# Patient Record
Sex: MALE | Race: WHITE
[De-identification: names, ages, dates, MRNs, and addresses within clinical notes are randomized per-mention and may not be internally consistent; named-entity substitution may affect disease eponyms.]

---

## 2017-01-28 ENCOUNTER — HOSPITAL ENCOUNTER (INPATIENT)
Dept: HOSPITAL 53 - M ED | Age: 42
LOS: 10 days | Discharge: NURSING, REHAB OR LONG-TERM CARE | DRG: 755 | End: 2017-02-07
Attending: PSYCHIATRY & NEUROLOGY | Admitting: PSYCHIATRY & NEUROLOGY
Payer: SELF-PAY

## 2017-01-28 VITALS — DIASTOLIC BLOOD PRESSURE: 80 MMHG | SYSTOLIC BLOOD PRESSURE: 131 MMHG

## 2017-01-28 VITALS — WEIGHT: 187.83 LBS | HEIGHT: 74 IN | BODY MASS INDEX: 24.11 KG/M2

## 2017-01-28 DIAGNOSIS — F15.90: ICD-10-CM

## 2017-01-28 DIAGNOSIS — F43.10: Primary | ICD-10-CM

## 2017-01-28 DIAGNOSIS — F12.90: ICD-10-CM

## 2017-01-28 DIAGNOSIS — Z88.0: ICD-10-CM

## 2017-01-28 DIAGNOSIS — F17.210: ICD-10-CM

## 2017-01-28 LAB
ALBUMIN SERPL BCG-MCNC: 4.2 GM/DL (ref 3.2–5.2)
ALBUMIN/GLOB SERPL: 1.31 {RATIO} (ref 1–1.93)
ALP SERPL-CCNC: 61 U/L (ref 45–117)
ALT SERPL W P-5'-P-CCNC: 21 U/L (ref 12–78)
AMPHETAMINES UR QL SCN: NEGATIVE
ANION GAP SERPL CALC-SCNC: 9 MEQ/L (ref 8–16)
AST SERPL-CCNC: 15 U/L (ref 15–37)
BENZODIAZ UR QL SCN: NEGATIVE
BILIRUB CONJ SERPL-MCNC: 0.1 MG/DL (ref 0–0.2)
BILIRUB SERPL-MCNC: 0.3 MG/DL (ref 0.2–1)
BUN SERPL-MCNC: 20 MG/DL (ref 7–18)
BZE UR QL SCN: NEGATIVE
CALCIUM SERPL-MCNC: 9.2 MG/DL (ref 8.5–10.1)
CHLORIDE SERPL-SCNC: 103 MEQ/L (ref 98–107)
CO2 SERPL-SCNC: 30 MEQ/L (ref 21–32)
CONTROL LINE: (no result)
CREAT SERPL-MCNC: 1.19 MG/DL (ref 0.7–1.3)
ERYTHROCYTE [DISTWIDTH] IN BLOOD BY AUTOMATED COUNT: 14.5 % (ref 11.5–14.5)
GFR SERPL CREATININE-BSD FRML MDRD: > 60 ML/MIN/{1.73_M2} (ref 60–?)
GLUCOSE SERPL-MCNC: 125 MG/DL (ref 70–105)
MCH RBC QN AUTO: 28.6 PG (ref 27–33)
MCHC RBC AUTO-ENTMCNC: 33.3 G/DL (ref 32–36.5)
MCV RBC AUTO: 85.8 FL (ref 80–96)
METHADONE UR QL SCN: NEGATIVE
OPIATES UR QL SCN: NEGATIVE
PLATELET # BLD AUTO: 324 K/MM3 (ref 150–450)
POTASSIUM SERPL-SCNC: 4.1 MEQ/L (ref 3.5–5.1)
PROT SERPL-MCNC: 7.4 GM/DL (ref 6.4–8.2)
SODIUM SERPL-SCNC: 142 MEQ/L (ref 136–145)
TRICYCLIC ANTIDEPRESS URINE: NEGATIVE
WBC # BLD AUTO: 9.8 K/MM3 (ref 4–10)

## 2017-01-28 NOTE — EDDOCDS
Physician Documentation                                                                           

[facility]                                                                         

Name: Cathi Andrew                                                                                 

Age: 41 yrs                                                                                       

Sex: Male                                                                                         

: 1975                                                                                   

MRN: [id number]                                                                                     

Arrival Date: 2017                                                                          

Time: 17:18                                                                                       

Account#: [id number]                                                                              

Bed [facility]                                                                                          

Private MD: NO PRIMARY PHYSICIAN, .                                                               

Disposition:                                                                                      

17 20:26 Hospitalization ordered by Smith Acevedo for Inpatient Admission. Preliminary    

diagnosis is Adjustment disorder with depressed mood.                                           

- Bed requested for Admit.                                                                        

- Status is Inpatient Admission.                                                              slm 

- Condition is Stable.                                                                            

- Problem is new.                                                                                 

- Symptoms are unchanged.                                                                         

                                                                                                  

                                                                                                  

                                                                                                  

Historical:                                                                                       

- Allergies: PENICILLINS (Hives);                                                                 

- Home Meds:                                                                                      

1. none                                                                                         

- PMHx: Depression; Degenerative disc disease; herniated discs C6-7;                              

- PSHx: left eye FB;                                                                              

- Social history: Smoking status: Patient uses tobacco products, current every day                

smoker. No barriers to communication noted, The patient speaks fluent English.                  

- Family history: Not pertinent.                                                                  

- : The pt / caregiver states he / she is not on anticoagulants. Home medication list             

is obtained from the patient.                                                                   

- Exposure Risk Screening:: None identified.                                                      

                                                                                                  

                                                                                                  

Vital Signs:                                                                                      

                                                                                             

17:19  / 92; Pulse 112; Resp 20; Temp 96.0; Pulse Ox 99% ; Weight 81.65 kg / 180.01     elp 

      lbs; Height 6 ft. 2 in. (187.96 cm); Pain 0/10;                                             

20:28  / 91; Pulse 83; Resp 18; Temp 97.5(O); Pulse Ox 99% ; Pain 0/10;                 mas 

17:19 Body Mass Index 23.11 (81.65 kg, 187.96 cm)                                             elp 

                                                                                                  

MDM:                                                                                              

17:31 REGULAR DIET PLASTIC WARE+DIET ordered.                                                 EDMS

17:38 Consult PFS/PSA/ ordered.                                                  br1 

17:38 Consult PFS/PSA/: Patient's case requires discussion with on-call          br1 

      Psychiatrist ordered.                                                                       

17:38 PSA/PFS to call Nursing Supervisor, to enter patient data on [place] Safe Act if patient    br1 

      involuntarily admitted or transferred for SI or HI ordered.                                 

17:38 Confirm accurate psychiatric medication list and times of last dosage ordered.          br1 

17:38 Detain Pt Until Medically/PFS Cleared ordered.                                          br1 

17:40 Ethyl Alcohol (ethanol) Ordered.                                                        EDMS

18:39 Acetaminophen Level Ordered.                                                            EDMS

18:39 Basic Metabolic Profile Ordered.                                                        EDMS

18:39 Complete Blood Count Ordered.                                                           EDMS

18:39 Drug Eval Toxicology ED Only Ordered.                                                   EDMS

18:39 Liver Profile Ordered.                                                                  EDMS

18:39 Salicylate Level Ordered.                                                               EDMS

18:39 Thyroid Stimulating Hormone Ordered.                                                    EDMS

19:51 Complete Blood Count Reviewed.                                                          br1 

19:51 Drug Eval Toxicology ED Only Reviewed.                                                  br1 

20:08 Financial registration complete.                                                        zo  

20:10 Consult PFS/PSA/ complete.                                                 ml4 

20:10 Consult PFS/PSA/: Patient's case requires discussion with on-call          ml4 

      Psychiatrist complete.                                                                      

20:10 PSA/PFS to call Nursing Supervisor, to enter patient data [place] Safe [place] if patient    ml4 

      involuntarily admitted or transferred for SI or HI complete.                                

20:13 Admit to [facility]: ordered.                                                                 EDMS

20:14 REGULAR DIET ordered.                                                                   EDMS

20:15 [facility] Legal paperwork was scanned into [employer] and attached to record.                    ml4 

20:21 Acetaminophen Level Reviewed.                                                           br1 

20:21 Basic Metabolic Profile Reviewed.                                                       br1 

20:21 Salicylate Level Reviewed.                                                              br1 

20:21 Liver Profile Reviewed.                                                                 br1 

20:21 Thyroid Stimulating Hormone Reviewed.                                                   br1 

20:45 ETHYL ALCOHOL (ETHANOL) Ordered.                                                        EDMS

                                                                                                  

Signatures:                                                                                       

Dispatcher [employer]                           EDMS                                                 

Carolina Nichols, PSA                PSA  ml4                                                  

Marlin Urias Brian, MD MD   br1                                                  

Leena Granados, RN                    RN   Adelita Saldana LPN LPN slm                                                  

                                                                                                  

The chart was reviewed and I authenticate all verbal orders and agree with the evaluation and 
treatment provided.Corrections: (The following items were deleted from the chart)

17:42 17:40 ACETAMINOPHEN LEVEL+LAB ordered. EDMS                                             EDMS

17:42 17:40 BASIC METABOLIC PROFILE+LAB ordered. EDMS                                         EDMS

17:42 17:40 COMPLETE BLOOD COUNT+LAB ordered. EDMS                                            EDMS

17:42 17:40 DRUG EVAL TOXICOLOGY ED ONLY+LAB ordered. EDMS                                    EDMS

                                                                                                  

**************************************************************************************************

MTDD

## 2017-01-28 NOTE — EDDOCDS
Nurse's Notes                                                                                     

[facility]                                                                         

Name: Cathi Andrew                                                                                 

Age: 41 yrs                                                                                       

Sex: Male                                                                                         

: 1975                                                                                   

MRN: [id number]                                                                                     

Arrival Date: 2017                                                                          

Time: 17:18                                                                                       

Account#: [id number]                                                                              

Bed [facility]                                                                                          

Private MD: NO PRIMARY PHYSICIAN, .                                                               

Diagnosis: Adjustment disorder with depressed mood                                                

                                                                                                  

Presentation:                                                                                     

                                                                                             

17:22 Presenting complaint: Patient states: increased depression over past couple weeks with  jjr 

      some SI. Mental Health Triage Level: Level 2: The patient displays active suicidal          

      ideations. Adult Sepsis Screening: The patient does not have new or worsening altered       

      mentation. Patient's respiratory rate is less than 22. Systolic blood pressure is           

      greater than 100. Patient has a qSOFA score of 0- Negative Sepsis Screen.                   

      Suicide/Homicide risk assessment- The patient admits to and/or has been reported to be      

      having suicidal ideations. The patient reports that he/she has not been admitted to an      

      inpatient mental health facility in the last 30 days. The patient reports that he/she       

      has a recent or current history of substance abuse. The patient reports that he/she has     

      a prior history of suicide attempt and/or organized plan.  Status: Patient is       

      not a  or  dependent. Transition of care: patient was not             

      received from another setting of care.                                                      

17:22 Acuity: ELIZABETH Level 3                                                                     jjr 

17:22 Method Of Arrival: Walkin/Carried/Asstd                                                 jjr 

                                                                                                  

Triage Assessment:                                                                                

17:24 General: Appears in no apparent distress, Behavior is appropriate for age, cooperative. jjr 

      Pain: Denies pain. Pt Declines HIV testing.                                                 

                                                                                                  

Historical:                                                                                       

- Allergies: PENICILLINS (Hives);                                                                 

- Home Meds:                                                                                      

1. none                                                                                         

- PMHx: Depression; Degenerative disc disease; herniated discs C6-7;                              

- PSHx: left eye FB;                                                                              

- Social history: Smoking status: Patient uses tobacco products, current every day                

smoker. No barriers to communication noted, The patient speaks fluent English.                  

- Family history: Not pertinent.                                                                  

- : The pt / caregiver states he / she is not on anticoagulants. Home medication list             

is obtained from the patient.                                                                   

- Exposure Risk Screening:: None identified.                                                      

                                                                                                  

                                                                                                  

Screenin:17 Screening information is obtained from the patient. Fall risk: No risks identified.     slm 

      Assistance ADL's: requires no assistance with activities of daily living. Nutritional       

      screening: No deficits noted. Advance Directives: Currently, there is no health care        

      proxy. There is no active DNR order. There is no living will. There is no Power of          

      . Advance directive information has not previously been placed in an [facility]            

      medical record. Further advance directive information is declined.                          

20:30 Abuse/DV Screen: The patient / caregiver reports he/she is: not in a situation that     slm 

      causes fear, pain or injury. home support is adequate.                                      

                                                                                                  

Assessment:                                                                                       

19:17 General: Appears in no apparent distress, comfortable, Behavior is appropriate for age, slm 

      cooperative, quiet. General:. Respiratory: Airway is patent Respiratory effort is even,     

      unlabored. Derm: Skin is pink, warm & dry.                                                

19:18 General: pt sitting on stretcher quietly labs done at this time pt denies needs         slm 

      security observing .                                                                        

20:09 General: Appears in no apparent distress, comfortable, Behavior is appropriate for age, slm 

      cooperative, quiet, pt sitting on stretcher security observing .                            

20:29 Reassessment: Patient appears in no apparent distress at this time.                     [facility] 

                                                                                                  

Mental Health Eval:                                                                               

18:40 Mental health consult is initiated at 18:30.  Status: The patient is not a      ml4 

       or  dependent. [facility]: The patient is not an          

      established patient of [facility]. Referral Information: Evaluation referral     

      is generated by the patient himself / herself. The patient was referred for evaluation      

      because fleeting thoughts of suicide with no specific plan. . Subjective: The patients      

      chief complaint is pt states, "I'm just really depressed and I want to die." Pt reports     

      suffering from suicide for the past 2 wks with no specific plan. Suicidal triggers          

      include having relational problems with family, along with being homeless. Admits being     

      homeless for one year in [place], then decided to move back to [place] December() hoping      

      his family would help him out. States he's been living with his Mother since December,      

      but now has left her residence due to on-going relational problems. Pt states, "she's       

      an alcoholic and treats me bad." Other stressors include reflecting back on his             

      mistakes this past summer where he was abusing Meth. Admits having trouble grasping the     

      fact he was smoking meth and feels guilty. Pt appears very depressed at bedside,            

      tearful, continues to voice SI, no plan. . Delusions are denied. Patient's mood is          

      depressed, hopeless, Hallucinations are denied. Mental Health history: ADHD, alcohol        

      abuse, depression, abusing methamphetamine. Mental Health Admissions: [facility], 12     

      and d/c 12 after SI with plan to jump in front of a car. He was also hospitalized      

      to [facility] following a suicide attempt(10 Vicodin tablets mixed with alcohol) on 03       

      and d/c 03 Current Outpatient Mental Health Services: None. Current living              

      environment is Family / Home Support: poor family support homeless. The patient is          

      . Patient presents to Emergency Department with the following symptoms within       

      the past 2 weeks: agitation, anger, anxiety, decreased appetite, depressed mood, drug       

      abuse, excessive guilt, feelings of helplessness/hopelessness, non-compliance, poor         

      concentration, relational problem, sleep disturbance - insomnia, suicidal ideation with     

      no plan. Substance abuse: Patient uses marijuana one joint monthly Patient uses tobacco     

      less than a 1/2 a pack Frequency daily. Mental status exam: Patients appearance is          

      appropriate, Patient's behavior is cooperative, Speech is slow. Affect is flat. Mood is     

      depressed. Hallucinations are denied. Appetite is poor. Memory is good. Energy level is     

      normal. Content of thought is depressive. due to active SI with no plan. Thought            

      process is intact. Cognitive level is oriented to person, place, time and situation         

      Patient's insight is fair. Judgement is fair. Rapport with interviewer is good.             

      Suicidal Ideation is present with no specific plan. Homicidal ideation is denied.           

      Disposition: Medically cleared for disposition by Kevon Conley MD. Narrative: Awaiting      

      psychiatric consult...                                                                      

20:05 Disposition: Psychiatric Consult is performed by phone with Dr Smith Acevedo MD. [facility]  ml4 

      Admission Criteria: The patient is experiencing suicidal ideation. The patient requires     

      continuous observation and/or control to protect self, others or property. The              

      patient's care requires a multi-modal treatment plan under close supervision and            

      coordination due to the complexity and severity of the patient's symptoms. The patient      

      requires administration and monitoring of psychoactive medications by skilled medical       

      providers due to the side effects of the psychoactive medications or significant dosage     

      adjustments. Legal Status: Patient's legal status will be Emergency admission:  [place]     

      Safe Act: [place] Safe Act is applicable to this patient. The patient poses a risk to      

      self or other and the Nursing Supervisor has been notified. He/She will enter the           

      patient's data. DSM-V Differential Diagnosis: Adjustment Disorder (F43.2) with              

      depressed mood (F43.21). Narrative: Notice of Status and Rights, FAQ, and Bill of           

      Rights was given at bedside.                                                                

                                                                                                  

Vital Signs:                                                                                      

17:19  / 92; Pulse 112; Resp 20; Temp 96.0; Pulse Ox 99% ; Weight 81.65 kg; Height 6    elp 

      ft. 2 in. (187.96 cm); Pain 0/10;                                                           

20:28  / 91; Pulse 83; Resp 18; Temp 97.5(O); Pulse Ox 99% ; Pain 0/10;                 mas 

17:19 Body Mass Index 23.11 (81.65 kg, 187.96 cm)                                             elp 

                                                                                                  

Vitals:                                                                                           

17:19 Log In Time: 2017 at 17:17. RN notified that patient meets [place]         elp 

      criteria.                                                                                   

                                                                                                  

ED Course:                                                                                        

17:19 Patient visited by Mercy Tom PCA.                                                  elp 

17:19 NO PRIMARY PHYSICIAN, . is Private Physician.                                           elp 

17:19 Patient moved to Waiting                                                                elp 

17:21 Patient visited by Mercy Tom PCA.                                                  elp 

17:23 Triage Initiated                                                                        jjr 

17:25 Patient moved to [facility]                                                                   jjr 

17:28 Kevon Conley MD is Attending Physician.                                               br1 

17:35 Patient visited by Libra Doe PCA.                                                tmm1

17:51 Patient visited by Libra oDe PCA.                                                tmm1

18:06 Patient visited by Libra Doe PCA.                                                tmm1

18:13 Patient visited by Libra Doe PCA.                                                tmm1

18:17 Patient visited by Kevon Conley MD.                                                   br1 

18:27 Patient visited by Libra Doe PCA.                                                tmm1

18:44 Patient visited by Libra Doe PCA.                                                tmm1

18:56 Patient visited by Libra Doe PCA.                                                tmm1

19:10 Patient visited by Samm Cruz, Security Aide.                                       pjf 

19:17 Adelita Kelly LPN is Primary Nurse.                                                slm 

19:19 Patient visited by Adelita Kelly LPN.                                              slm 

19:20 Acetaminophen Level Sent.                                                               slm 

19:20 Basic Metabolic Profile Sent.                                                           slm 

19:20 Complete Blood Count Sent.                                                              slm 

19:20 Drug Eval Toxicology ED Only Sent.                                                      slm 

19:20 Liver Profile Sent.                                                                     slm 

19:20 Salicylate Level Sent.                                                                  slm 

19:20 Thyroid Stimulating Hormone Sent.                                                       slm 

19:20 Ethyl Alcohol (ethanol) Sent.                                                           slm 

19:30 Patient visited by Thor Wilson.                                                   mas 

19:48 Patient visited by Thor Wilson.                                                   mas 

20:02 Patient visited by Adelita Kelly LPN.                                              [facility] 

20:15 [facility] Legal paperwork was scanned into "[employer] and attached to record.                    ml4 

20:16 Patient visited by Thor Wilson.                                                   mas 

20:26 Smith Acevedo MD is Hospitalizing Provider.                                          br1 

20:29 Patient visited by Thor Wilson.                                                   mas 

20:30 Patient visited by Adelita Kelly LPN.                                              slm 

20:30 The patient / caregiver is instructed regarding the plan of care and ED course. Patient slm 

      has correct armband on for positive identification. Placed in psych safe attire. Bed in     

      low position. Call light in reach. Security observing.                                      

20:30 No IV's were initiated during this patient's visit. No procedures done that require     slm 

      assistance.                                                                                 

20:45 Patient visited by Thor Wilson.                                                   mas 

                                                                                                  

Attachments:                                                                                      

20:15 E Legal paperwork                                                                     ml4 

                                                                                                  

Order Results:                                                                                    

Lab Order: Acetaminophen Level; SPEC'M 17 19:14                                             

      Test: ACETAMINOPHEN LEVEL; Value: < 2.0; Range: 10.0-30.0; Abnormal: Below low normal;      

      Units: UG/ML; Status: F                                                                     

Lab Order: Basic Metabolic Profile; SPEC'M 17 19:14                                         

      Test: GLUCOSE, FASTING; Value: 125; Range: ; Abnormal: Above high normal; Units:      

      MG/DL; Status: F                                                                            

      Test: BLOOD UREA NITROGEN; Value: 20; Range: 7-18; Abnormal: Above high normal; Units:      

      MG/DL; Status: F                                                                            

      Test: CREATININE FOR GFR; Value: 1.19; Range: 0.70-1.30; Units: MG/DL; Status: F            

      Test: GLOMERULAR FILTRATION RATE; Value: > 60.0; Range: >60; Status: F                      

      Test: SODIUM LEVEL; Value: 142; Range: 136-145; Units: MEQ/L; Status: F                     

      Test: POTASSIUM SERUM; Value: 4.1; Range: 3.5-5.1; Units: MEQ/L; Status: F                  

      Test: CHLORIDE LEVEL; Value: 103; Range: ; Units: MEQ/L; Status: F                    

      Test: CARBON DIOXIDE LEVEL; Value: 30; Range: 21-32; Units: MEQ/L; Status: F                

      Test: ANION GAP; Value: 9; Range: 8-16; Units: MEQ/L; Status: F                             

      Test: CALCIUM LEVEL; Value: 9.2; Range: 8.5-10.1; Units: MG/DL; Status: F                   

      Test Note: &nbsp;; Units are mL/min/1.73 m2 Chronic Kidney Disease Staging per NKF:       

      Stage I & II GFR >=60 Normal to Mildly Decreased Stage III GFR 30-59 Moderately           

      Decreased Stage IV GFR 15-29 Severely Decreased Stage V GFR <15 Very Little GFR Left        

      ESRD GFR <15 on RRT                                                                         

Lab Order: Complete Blood Count; SPEC' 17 19:14                                            

      Test: WHITE BLOOD COUNT; Value: 9.8; Range: 4.0-10.0; Units: K/mm3; Status: F               

      Test: RED BLOOD COUNT; Value: 5.23; Range: 4.30-6.10; Units: M/mm3; Status: F               

      Test: HEMOGLOBIN; Value: 15.0; Range: 14.0-18.0; Units: g/dl; Status: F                     

      Test: HEMATOCRIT; Value: 44.9; Range: 42.0-52.0; Units: %; Status: F                        

      Test: MEAN CORPUSCULAR VOLUME; Value: 85.8; Range: 80.0-96.0; Units: fl; Status: F          

      Test: MEAN CORPUSCULAR HEMOGLOBIN; Value: 28.6; Range: 27.0-33.0; Units: pg; Status: F      

      Test: MEAN CORPUSCULAR HGB CONC; Value: 33.3; Range: 32.0-36.5; Units: g/dl; Status: F      

      Test: RED CELL DISTRIBUTION WIDTH; Value: 14.5; Range: 11.5-14.5; Units: %; Status: F       

      Test: PLATELET COUNT, AUTOMATED; Value: 324; Range: 150-450; Units: k/mm3; Status: F        

Lab Order: Drug Eval Toxicology ED Only; SPEC'M 17 19:14                                    

      Test: AMPHETAMINES LEVEL URINE; Value: NEGATIVE; Range: NEGATIVE; Status: F                 

      Test: BARBITURATES URINE; Value: NEGATIVE; Range: NEGATIVE; Status: F                       

      Test: BENZODIAZEPINES URINE; Value: NEGATIVE; Range: NEGATIVE; Status: F                    

      Test: CANNABINOIDS URINE; Value: NEGATIVE; Range: NEGATIVE; Status: F                       

      Test: COCAINE METABOLITE URINE; Value: NEGATIVE; Range: NEGATIVE; Status: F                 

      Test: METHADONE URINE; Value: NEGATIVE; Range: NEGATIVE; Status: F                          

      Test: OPIATES URINE; Value: NEGATIVE; Range: NEGATIVE; Status: F                            

      Test: TRICYCLIC ANTIDEPRESS URINE; Value: NEGATIVE; Range: NEGATIVE; Status: F              

      Test Note: &nbsp;; ****ALL PRESUMPTIVE POSITIVE FINDINGS ARE UNCONFIRMED**** NORMAL       

      VALUES THRESHOLD IN NG/ML AMPHETAMINES 1000 METHAMPHETAMINES 1000 BARBITURATES 300          

      BENZODIAZEPINES 300 CANNABINOIDS (THC) 50 COCAINE METABOLITE 300 METHADONE 300 OPIATES      

      300 PHENCYCLIDINE 25 TRICYCLIC ANTIDEPRESSANTS 1000 RESULTS ARE FOR MEDICAL PURPOSES        

      ONLY. ALL URINE SPECIMENS WILL BE SAVED FOR 3 DAYS. IF CONFIRMATION OF A PRESUMPTIVE        

      POSTIVE SCREEN RESULT IS DESIRED, CALL CHEMISTRY () AND REQUEST URINE TO BE SENT       

      TO REFERENCE LAB. FOR A LIST OF CLOSELY RELATED COMPOUNDS PLEASE CALL THE LAB.              

Lab Order: Liver Profile; SPEC'M 17 19:14                                                   

      Test: AST/SGOT; Value: 15; Range: 15-37; Units: U/L; Status: F                              

      Test: ALT/SGPT; Value: 21; Range: 12-78; Units: U/L; Status: F                              

      Test: ALKALINE PHOSPHATASE; Value: 61; Range: ; Units: U/L; Status: F                 

      Test: BILIRUBIN,TOTAL; Value: 0.3; Range: 0.2-1.0; Units: MG/DL; Status: F                  

      Test: BILIRUBIN,DIRECT; Value: 0.1; Range: 0.0-0.2; Units: MG/DL; Status: F                 

      Test: TOTAL PROTEIN; Value: 7.4; Range: 6.4-8.2; Units: GM/DL; Status: F                    

      Test: ALBUMIN; Value: 4.2; Range: 3.2-5.2; Units: GM/DL; Status: F                          

      Test: ALBUMIN/GLOBULIN RATIO; Value: 1.31; Range: 1.00-1.93; Status: F                      

Lab Order: Salicylate Level; SPEC'M 17 19:14                                                

      Test: SALICYLATE LEVEL; Value: < 1.7; Range: 5.0-30.0; Abnormal: Below low normal;          

      Units: MG/DL; Status: F                                                                     

Lab Order: Thyroid Stimulating Hormone; SPEC'M 17 19:14                                     

      Test: THYROID STIMULATING HORMONE; Value: 0.593; Range: 0.358-3.740; Units: uIU/ML;         

      Status: F                                                                                   

                                                                                                  

Outcome:                                                                                          

20:26 Decision to Hospitalize by Provider.                                                    br1 

20:29 Discharge Assessment: Patient awake, alert and oriented x 3. No cognitive and/or        slm 

      functional deficits noted. Patient verbalized understanding of disposition                  

      instructions. patient administered narcotics - no. The following High Risk Discharge        

      criteria are identified: None. Admitted to Psych accompanied by tech, via wheelchair,       

      with chart. Condition: stable. No special radiology studies were completed. Property        

      removed, inventory done, secured in belongings bag- placed in locked locker.                

20:47 Patient left the ED.                                                                    slm 

                                                                                                  

Signatures:                                                                                       

Samm Cruz, Security Aide           Securplianef                                                  

Carolina Nichols, PSA                PSA  ml4                                                  

Kevon Conley MD MD   br1                                                  

Leena Granados, RN                    RN   Thor Muir Theresa, PCA                    PCA  tmm1                                                 

Patchen, Mercy, PCA                      PCA  elp                                                  

Adelita Kelly,LPN                  LPN  slm                                                  

                                                                                                  

**************************************************************************************************

MTDD

## 2017-01-29 VITALS — SYSTOLIC BLOOD PRESSURE: 102 MMHG | DIASTOLIC BLOOD PRESSURE: 58 MMHG

## 2017-01-29 VITALS — DIASTOLIC BLOOD PRESSURE: 68 MMHG | SYSTOLIC BLOOD PRESSURE: 142 MMHG

## 2017-01-29 RX ADMIN — SERTRALINE HYDROCHLORIDE SCH MG: 50 TABLET ORAL at 10:20

## 2017-01-30 VITALS — DIASTOLIC BLOOD PRESSURE: 72 MMHG | SYSTOLIC BLOOD PRESSURE: 117 MMHG

## 2017-01-30 VITALS — DIASTOLIC BLOOD PRESSURE: 75 MMHG | SYSTOLIC BLOOD PRESSURE: 135 MMHG

## 2017-01-30 RX ADMIN — SERTRALINE HYDROCHLORIDE SCH MG: 50 TABLET ORAL at 08:28

## 2017-01-30 NOTE — EDDOCDS
Physician Documentation                                                                           

[facility]                                                                         

Name: Cathi Andrew                                                                                 

Age: 41 yrs                                                                                       

Sex: Male                                                                                         

: 1975                                                                                   

MRN: [id number]                                                                                     

Arrival Date: 2017                                                                          

Time: 17:18                                                                                       

Account#: [id number]                                                                              

Bed [facility]                                                                                          

Private MD: NO PRIMARY PHYSICIAN, .                                                               

Disposition:                                                                                      

17 20:26 Hospitalization ordered by Smith Acevedo for Inpatient Admission. Preliminary    

diagnosis is Adjustment disorder with depressed mood.                                           

- Bed requested for Admit.                                                                        

- Status is Inpatient Admission.                                                              slm 

- Condition is Stable.                                                                            

- Problem is new.                                                                                 

- Symptoms are unchanged.                                                                         

                                                                                                  

                                                                                                  

                                                                                                  

Historical:                                                                                       

- Allergies: PENICILLINS (Hives);                                                                 

- Home Meds:                                                                                      

1. none                                                                                         

- PMHx: Depression; Degenerative disc disease; herniated discs C6-7;                              

- PSHx: left eye FB;                                                                              

- Social history: Smoking status: Patient uses tobacco products, current every day                

smoker. No barriers to communication noted, The patient speaks fluent English.                  

- Family history: Not pertinent.                                                                  

- : The pt / caregiver states he / she is not on anticoagulants. Home medication list             

is obtained from the patient.                                                                   

- Exposure Risk Screening:: None identified.                                                      

                                                                                                  

                                                                                                  

Vital Signs:                                                                                      

                                                                                             

17:19  / 92; Pulse 112; Resp 20; Temp 96.0; Pulse Ox 99% ; Weight 81.65 kg / 180.01     elp 

      lbs; Height 6 ft. 2 in. (187.96 cm); Pain 0/10;                                             

20:28  / 91; Pulse 83; Resp 18; Temp 97.5(O); Pulse Ox 99% ; Pain 0/10;                 mas 

17:19 Body Mass Index 23.11 (81.65 kg, 187.96 cm)                                             elp 

                                                                                                  

MDM:                                                                                              

17:31 REGULAR DIET PLASTIC WARE+DIET ordered.                                                 EDMS

17:38 Consult PFS/PSA/ ordered.                                                  br1 

17:38 Consult PFS/PSA/: Patient's case requires discussion with on-call          br1 

      Psychiatrist ordered.                                                                       

17:38 PSA/PFS to call Nursing Supervisor, to enter patient data on [place] Safe [place] if patient    br1 

      involuntarily admitted or transferred for SI or HI ordered.                                 

17:38 Confirm accurate psychiatric medication list and times of last dosage ordered.          br1 

17:38 Detain Pt Until Medically/PFS Cleared ordered.                                          br1 

17:40 Ethyl Alcohol (ethanol) Ordered.                                                        EDMS

18:39 Acetaminophen Level Ordered.                                                            EDMS

18:39 Basic Metabolic Profile Ordered.                                                        EDMS

18:39 Complete Blood Count Ordered.                                                           EDMS

18:39 Drug Eval Toxicology ED Only Ordered.                                                   EDMS

18:39 Liver Profile Ordered.                                                                  EDMS

18:39 Salicylate Level Ordered.                                                               EDMS

18:39 Thyroid Stimulating Hormone Ordered.                                                    EDMS

19:51 Complete Blood Count Reviewed.                                                          br1 

19:51 Drug Eval Toxicology ED Only Reviewed.                                                  br1 

20:08 Financial registration complete.                                                        zo  

20:10 Consult PFS/PSA/ complete.                                                 ml4 

20:10 Consult PFS/PSA/: Patient's case requires discussion with on-call          ml4 

      Psychiatrist complete.                                                                      

20:10 PSA/PFS to call Nursing Supervisor, to enter patient data [place] Safe [place] if patient    ml4 

      involuntarily admitted or transferred for SI or HI complete.                                

20:13 Admit to [facility]: ordered.                                                                 EDMS

20:14 REGULAR DIET ordered.                                                                   EDMS

20:15 [facility] Legal paperwork was scanned into [facility] and attached to record.                    ml4 

20:21 Acetaminophen Level Reviewed.                                                           br1 

20:21 Basic Metabolic Profile Reviewed.                                                       br1 

20:21 Salicylate Level Reviewed.                                                              br1 

20:21 Liver Profile Reviewed.                                                                 br1 

20:21 Thyroid Stimulating Hormone Reviewed.                                                   br1 

20:45 ETHYL ALCOHOL (ETHANOL) Ordered.                                                        EDMS

21:06 [facility] Payment Agreement was scanned into [facility] and attached to record.               zo  

                                                                                             

20:56 T-Sheet-- Draft Copy was scanned into [facility] and attached to record.                   klr 

                                                                                                  

Signatures:                                                                                       

Dispatcher [employer]                           EDMS                                                 

Carolina Nichols, PSA                PSA  ml4                                                  

Marlin Urias Brian, MD MD   br1                                                  

Leena Granados, JACEY                    RN   Adelita Saldana LPN LPN slm Redder, Kathie klr                                                  

                                                                                                  

The chart was reviewed and I authenticate all verbal orders and agree with the evaluation and 
treatment provided.Corrections: (The following items were deleted from the chart)

                                                                                             

17:42 17:40 ACETAMINOPHEN LEVEL+LAB ordered. EDMS                                             EDMS

17:42 17:40 BASIC METABOLIC PROFILE+LAB ordered. EDMS                                         EDMS

17:42 17:40 COMPLETE BLOOD COUNT+LAB ordered. EDMS                                            EDMS

17:42 17:40 DRUG EVAL TOXICOLOGY ED ONLY+LAB ordered. EDMS                                    EDMS

                                                                                                  

Attachments:                                                                                      

21:06 [facility] Payment Agreement                                                                zo  

                                                                                             

20:56 T-Sheet-- Draft Copy                                                                    klr 

                                                                                                  

**************************************************************************************************



*** Chart Complete ***
MTDD

## 2017-01-30 NOTE — EDDOCDS
Nurse's Notes                                                                                     

[facility]                                                                         

Name: Cathi Andrew                                                                                 

Age: 41 yrs                                                                                       

Sex: Male                                                                                         

: 1975                                                                                   

MRN: [id number]                                                                                     

Arrival Date: 2017                                                                          

Time: 17:18                                                                                       

Account#: [id number]                                                                              

Bed [facility]                                                                                          

Private MD: NO PRIMARY PHYSICIAN, .                                                               

Diagnosis: Adjustment disorder with depressed mood                                                

                                                                                                  

Presentation:                                                                                     

                                                                                             

17:22 Presenting complaint: Patient states: increased depression over past couple weeks with  jjr 

      some SI. Mental Health Triage Level: Level 2: The patient displays active suicidal          

      ideations. Adult Sepsis Screening: The patient does not have new or worsening altered       

      mentation. Patient's respiratory rate is less than 22. Systolic blood pressure is           

      greater than 100. Patient has a qSOFA score of 0- Negative Sepsis Screen.                   

      Suicide/Homicide risk assessment- The patient admits to and/or has been reported to be      

      having suicidal ideations. The patient reports that he/she has not been admitted to an      

      inpatient mental health facility in the last 30 days. The patient reports that he/she       

      has a recent or current history of substance abuse. The patient reports that he/she has     

      a prior history of suicide attempt and/or organized plan.  Status: Patient is       

      not a  or  dependent. Transition of care: patient was not             

      received from another setting of care.                                                      

17:22 Acuity: ELIZABETH Level 3                                                                     jjr 

17:22 Method Of Arrival: Walkin/Carried/Asstd                                                 jjr 

                                                                                                  

Triage Assessment:                                                                                

17:24 General: Appears in no apparent distress, Behavior is appropriate for age, cooperative. jjr 

      Pain: Denies pain. Pt Declines HIV testing.                                                 

                                                                                                  

Historical:                                                                                       

- Allergies: PENICILLINS (Hives);                                                                 

- Home Meds:                                                                                      

1. none                                                                                         

- PMHx: Depression; Degenerative disc disease; herniated discs C6-7;                              

- PSHx: left eye FB;                                                                              

- Social history: Smoking status: Patient uses tobacco products, current every day                

smoker. No barriers to communication noted, The patient speaks fluent English.                  

- Family history: Not pertinent.                                                                  

- : The pt / caregiver states he / she is not on anticoagulants. Home medication list             

is obtained from the patient.                                                                   

- Exposure Risk Screening:: None identified.                                                      

                                                                                                  

                                                                                                  

Screenin:17 Screening information is obtained from the patient. Fall risk: No risks identified.     slm 

      Assistance ADL's: requires no assistance with activities of daily living. Nutritional       

      screening: No deficits noted. Advance Directives: Currently, there is no health care        

      proxy. There is no active DNR order. There is no living will. There is no Power of          

      . Advance directive information has not previously been placed in an [facility]            

      medical record. Further advance directive information is declined.                          

20:30 Abuse/DV Screen: The patient / caregiver reports he/she is: not in a situation that     slm 

      causes fear, pain or injury. home support is adequate.                                      

                                                                                                  

Assessment:                                                                                       

19:17 General: Appears in no apparent distress, comfortable, Behavior is appropriate for age, slm 

      cooperative, quiet. General:. Respiratory: Airway is patent Respiratory effort is even,     

      unlabored. Derm: Skin is pink, warm & dry.                                                

19:18 General: pt sitting on stretcher quietly labs done at this time pt denies needs         slm 

      security observing .                                                                        

20:09 General: Appears in no apparent distress, comfortable, Behavior is appropriate for age, slm 

      cooperative, quiet, pt sitting on stretcher security observing .                            

20:29 Reassessment: Patient appears in no apparent distress at this time.                     [facility] 

                                                                                                  

Mental Health Eval:                                                                               

18:40 Mental health consult is initiated at 18:30.  Status: The patient is not a      ml4 

       or  dependent. [facility]: The patient is not an          

      established patient of [facility]. Referral Information: Evaluation referral     

      is generated by the patient himself / herself. The patient was referred for evaluation      

      because fleeting thoughts of suicide with no specific plan. . Subjective: The patients      

      chief complaint is pt states, "I'm just really depressed and I want to die." Pt reports     

      suffering from suicide for the past 2 wks with no specific plan. Suicidal triggers          

      include having relational problems with family, along with being homeless. Admits being     

      homeless for one year in [place], then decided to move back to [place] December() hoping      

      his family would help him out. States he's been living with his Mother since December,      

      but now has left her residence due to on-going relational problems. Pt states, "she's       

      an alcoholic and treats me bad." Other stressors include reflecting back on his             

      mistakes this past summer where he was abusing Meth. Admits having trouble grasping the     

      fact he was smoking meth and feels guilty. Pt appears very depressed at bedside,            

      tearful, continues to voice SI, no plan. . Delusions are denied. Patient's mood is          

      depressed, hopeless, Hallucinations are denied. Mental Health history: ADHD, alcohol        

      abuse, depression, abusing methamphetamine. Mental Health Admissions: [facility], 12     

      and d/c 12 after SI with plan to jump in front of a car. He was also hospitalized      

      to [facility] following a suicide attempt(10 Vicodin tablets mixed with alcohol) on 03       

      and d/c 03 Current Outpatient Mental Health Services: None. Current living              

      environment is Family / Home Support: poor family support homeless. The patient is          

      . Patient presents to Emergency Department with the following symptoms within       

      the past 2 weeks: agitation, anger, anxiety, decreased appetite, depressed mood, drug       

      abuse, excessive guilt, feelings of helplessness/hopelessness, non-compliance, poor         

      concentration, relational problem, sleep disturbance - insomnia, suicidal ideation with     

      no plan. Substance abuse: Patient uses marijuana one joint monthly Patient uses tobacco     

      less than a 1/2 a pack Frequency daily. Mental status exam: Patients appearance is          

      appropriate, Patient's behavior is cooperative, Speech is slow. Affect is flat. Mood is     

      depressed. Hallucinations are denied. Appetite is poor. Memory is good. Energy level is     

      normal. Content of thought is depressive. due to active SI with no plan. Thought            

      process is intact. Cognitive level is oriented to person, place, time and situation         

      Patient's insight is fair. Judgement is fair. Rapport with interviewer is good.             

      Suicidal Ideation is present with no specific plan. Homicidal ideation is denied.           

      Disposition: Medically cleared for disposition by Kevon Conley MD. Narrative: Awaiting      

      psychiatric consult...                                                                      

20:05 Disposition: Psychiatric Consult is performed by phone with Dr Smith Acevedo MD. [facility]  ml4 

      Admission Criteria: The patient is experiencing suicidal ideation. The patient requires     

      continuous observation and/or control to protect self, others or property. The              

      patient's care requires a multi-modal treatment plan under close supervision and            

      coordination due to the complexity and severity of the patient's symptoms. The patient      

      requires administration and monitoring of psychoactive medications by skilled medical       

      providers due to the side effects of the psychoactive medications or significant dosage     

      adjustments. Legal Status: Patient's legal status will be Emergency admission:  [place]     

      Safe Act: [place] Safe Act is applicable to this patient. The patient poses a risk to      

      self or other and the Nursing Supervisor has been notified. He/She will enter the           

      patient's data. DSM-V Differential Diagnosis: Adjustment Disorder (F43.2) with              

      depressed mood (F43.21). Narrative: Notice of Status and Rights, FAQ, and Bill of           

      Rights was given at bedside.                                                                

                                                                                                  

Vital Signs:                                                                                      

17:19  / 92; Pulse 112; Resp 20; Temp 96.0; Pulse Ox 99% ; Weight 81.65 kg; Height 6    elp 

      ft. 2 in. (187.96 cm); Pain 0/10;                                                           

20:28  / 91; Pulse 83; Resp 18; Temp 97.5(O); Pulse Ox 99% ; Pain 0/10;                 mas 

17:19 Body Mass Index 23.11 (81.65 kg, 187.96 cm)                                             elp 

                                                                                                  

Vitals:                                                                                           

17:19 Log In Time: 2017 at 17:17. RN notified that patient meets [place]         elp 

      criteria.                                                                                   

                                                                                                  

ED Course:                                                                                        

17:19 Patient visited by Mercy Tom PCA.                                                  elp 

17:19 NO PRIMARY PHYSICIAN, . is Private Physician.                                           elp 

17:19 Patient moved to Waiting                                                                elp 

17:21 Patient visited by Mercy Tom PCA.                                                  elp 

17:23 Triage Initiated                                                                        jjr 

17:25 Patient moved to [facility]                                                                   jjr 

17:28 Kevon Conley MD is Attending Physician.                                               br1 

17:35 Patient visited by Libra Doe PCA.                                                tmm1

17:51 Patient visited by Libra Doe PCA.                                                tmm1

18:06 Patient visited by Libra Doe PCA.                                                tmm1

18:13 Patient visited by Libra Doe PCA.                                                tmm1

18:17 Patient visited by Kevon Conley MD.                                                   br1 

18:27 Patient visited by Libra Doe PCA.                                                tmm1

18:44 Patient visited by Libra Doe PCA.                                                tmm1

18:56 Patient visited by Libra Doe PCA.                                                tmm1

19:10 Patient visited by Samm Cruz, Security Aide.                                       pjf 

19:17 Adelita Kelly LPN is Primary Nurse.                                                slm 

19:19 Patient visited by Adelita Kelly LPN.                                              slm 

19:20 Acetaminophen Level Sent.                                                               slm 

19:20 Basic Metabolic Profile Sent.                                                           slm 

19:20 Complete Blood Count Sent.                                                              slm 

19:20 Drug Eval Toxicology ED Only Sent.                                                      slm 

19:20 Liver Profile Sent.                                                                     slm 

19:20 Salicylate Level Sent.                                                                  slm 

19:20 Thyroid Stimulating Hormone Sent.                                                       slm 

19:20 Ethyl Alcohol (ethanol) Sent.                                                           slm 

19:30 Patient visited by Thor Wilson.                                                   mas 

19:48 Patient visited by Thor Wilson.                                                   mas 

20:02 Patient visited by Adelita Kelly LPN.                                              slm 

20:15 E Legal paperwork was scanned into "[employer] and attached to record.                    ml4 

20:16 Patient visited by Thor Wilson.                                                   mas 

20:26 Smith Acevedo MD is Hospitalizing Provider.                                          br1 

20:29 Patient visited by Thor Wilson.                                                   mas 

20:30 Patient visited by Adelita Kelly LPN.                                              slm 

20:30 The patient / caregiver is instructed regarding the plan of care and ED course. Patient slm 

      has correct armband on for positive identification. Placed in psych safe attire. Bed in     

      low position. Call light in reach. Security observing.                                      

20:30 No IV's were initiated during this patient's visit. No procedures done that require     slm 

      assistance.                                                                                 

20:45 Patient visited by Thor Wilson.                                                   mas 

21:06 [facility] Payment Agreement was scanned into "[employer] and attached to record.               zo  

                                                                                             

20:56 T-Sheet-- Draft Copy was scanned into "[employer] and attached to record.                   klr 

                                                                                                  

Attachments:                                                                                      

                                                                                             

20:15 E Legal paperwork                                                                     ml4 

                                                                                                  

Order Results:                                                                                    

Lab Order: Acetaminophen Level; SPEC'M 17 19:14                                             

      Test: ACETAMINOPHEN LEVEL; Value: < 2.0; Range: 10.0-30.0; Abnormal: Below low normal;      

      Units: UG/ML; Status: F                                                                     

Lab Order: Basic Metabolic Profile; SPEC'M 17 19:14                                         

      Test: GLUCOSE, FASTING; Value: 125; Range: ; Abnormal: Above high normal; Units:      

      MG/DL; Status: F                                                                            

      Test: BLOOD UREA NITROGEN; Value: 20; Range: 7-18; Abnormal: Above high normal; Units:      

      MG/DL; Status: F                                                                            

      Test: CREATININE FOR GFR; Value: 1.19; Range: 0.70-1.30; Units: MG/DL; Status: F            

      Test: GLOMERULAR FILTRATION RATE; Value: > 60.0; Range: >60; Status: F                      

      Test: SODIUM LEVEL; Value: 142; Range: 136-145; Units: MEQ/L; Status: F                     

      Test: POTASSIUM SERUM; Value: 4.1; Range: 3.5-5.1; Units: MEQ/L; Status: F                  

      Test: CHLORIDE LEVEL; Value: 103; Range: ; Units: MEQ/L; Status: F                    

      Test: CARBON DIOXIDE LEVEL; Value: 30; Range: 21-32; Units: MEQ/L; Status: F                

      Test: ANION GAP; Value: 9; Range: 8-16; Units: MEQ/L; Status: F                             

      Test: CALCIUM LEVEL; Value: 9.2; Range: 8.5-10.1; Units: MG/DL; Status: F                   

      Test Note: &nbsp;; Units are mL/min/1.73 m2 Chronic Kidney Disease Staging per NKF:       

      Stage I & II GFR >=60 Normal to Mildly Decreased Stage III GFR 30-59 Moderately           

      Decreased Stage IV GFR 15-29 Severely Decreased Stage V GFR <15 Very Little GFR Left        

      ESRD GFR <15 on RRT                                                                         

Lab Order: Complete Blood Count; SPEC'M 17 19:14                                            

      Test: WHITE BLOOD COUNT; Value: 9.8; Range: 4.0-10.0; Units: K/mm3; Status: F               

      Test: RED BLOOD COUNT; Value: 5.23; Range: 4.30-6.10; Units: M/mm3; Status: F               

      Test: HEMOGLOBIN; Value: 15.0; Range: 14.0-18.0; Units: g/dl; Status: F                     

      Test: HEMATOCRIT; Value: 44.9; Range: 42.0-52.0; Units: %; Status: F                        

      Test: MEAN CORPUSCULAR VOLUME; Value: 85.8; Range: 80.0-96.0; Units: fl; Status: F          

      Test: MEAN CORPUSCULAR HEMOGLOBIN; Value: 28.6; Range: 27.0-33.0; Units: pg; Status: F      

      Test: MEAN CORPUSCULAR HGB CONC; Value: 33.3; Range: 32.0-36.5; Units: g/dl; Status: F      

      Test: RED CELL DISTRIBUTION WIDTH; Value: 14.5; Range: 11.5-14.5; Units: %; Status: F       

      Test: PLATELET COUNT, AUTOMATED; Value: 324; Range: 150-450; Units: k/mm3; Status: F        

Lab Order: Drug Eval Toxicology ED Only; SPEC'M 17 19:14                                    

      Test: AMPHETAMINES LEVEL URINE; Value: NEGATIVE; Range: NEGATIVE; Status: F                 

      Test: BARBITURATES URINE; Value: NEGATIVE; Range: NEGATIVE; Status: F                       

      Test: BENZODIAZEPINES URINE; Value: NEGATIVE; Range: NEGATIVE; Status: F                    

      Test: CANNABINOIDS URINE; Value: NEGATIVE; Range: NEGATIVE; Status: F                       

      Test: COCAINE METABOLITE URINE; Value: NEGATIVE; Range: NEGATIVE; Status: F                 

      Test: METHADONE URINE; Value: NEGATIVE; Range: NEGATIVE; Status: F                          

      Test: OPIATES URINE; Value: NEGATIVE; Range: NEGATIVE; Status: F                            

      Test: TRICYCLIC ANTIDEPRESS URINE; Value: NEGATIVE; Range: NEGATIVE; Status: F              

      Test Note: &nbsp;; ****ALL PRESUMPTIVE POSITIVE FINDINGS ARE UNCONFIRMED**** NORMAL       

      VALUES THRESHOLD IN NG/ML AMPHETAMINES 1000 METHAMPHETAMINES 1000 BARBITURATES 300          

      BENZODIAZEPINES 300 CANNABINOIDS (THC) 50 COCAINE METABOLITE 300 METHADONE 300 OPIATES      

      300 PHENCYCLIDINE 25 TRICYCLIC ANTIDEPRESSANTS 1000 RESULTS ARE FOR MEDICAL PURPOSES        

      ONLY. ALL URINE SPECIMENS WILL BE SAVED FOR 3 DAYS. IF CONFIRMATION OF A PRESUMPTIVE        

      POSTIVE SCREEN RESULT IS DESIRED, CALL CHEMISTRY () AND REQUEST URINE TO BE SENT       

      TO REFERENCE LAB. FOR A LIST OF CLOSELY RELATED COMPOUNDS PLEASE CALL THE LAB.              

Lab Order: Liver Profile; SPEC'M 17 19:14                                                   

      Test: AST/SGOT; Value: 15; Range: 15-37; Units: U/L; Status: F                              

      Test: ALT/SGPT; Value: 21; Range: 12-78; Units: U/L; Status: F                              

      Test: ALKALINE PHOSPHATASE; Value: 61; Range: ; Units: U/L; Status: F                 

      Test: BILIRUBIN,TOTAL; Value: 0.3; Range: 0.2-1.0; Units: MG/DL; Status: F                  

      Test: BILIRUBIN,DIRECT; Value: 0.1; Range: 0.0-0.2; Units: MG/DL; Status: F                 

      Test: TOTAL PROTEIN; Value: 7.4; Range: 6.4-8.2; Units: GM/DL; Status: F                    

      Test: ALBUMIN; Value: 4.2; Range: 3.2-5.2; Units: GM/DL; Status: F                          

      Test: ALBUMIN/GLOBULIN RATIO; Value: 1.31; Range: 1.00-1.93; Status: F                      

Lab Order: Salicylate Level; SPEC'M 17 19:14                                                

      Test: SALICYLATE LEVEL; Value: < 1.7; Range: 5.0-30.0; Abnormal: Below low normal;          

      Units: MG/DL; Status: F                                                                     

Lab Order: Thyroid Stimulating Hormone; SPEC'M 17 19:14                                     

      Test: THYROID STIMULATING HORMONE; Value: 0.593; Range: 0.358-3.740; Units: uIU/ML;         

      Status: F                                                                                   

Lab Order: ETHYL ALCOHOL (ETHANOL); SPEC'M 17 19:12                                         

      Test: ETHYL ALCOHOL (ETHANOL); Value: < 0.003; Range: 0.000-0.010; Units: %; Status: F      

                                                                                                  

Outcome:                                                                                          

                                                                                             

20:26 Decision to Hospitalize by Provider.                                                    br1 

20:29 Discharge Assessment: Patient awake, alert and oriented x 3. No cognitive and/or        slm 

      functional deficits noted. Patient verbalized understanding of disposition                  

      instructions. patient administered narcotics - no. The following High Risk Discharge        

      criteria are identified: None. Admitted to Psych accompanied by tech, via wheelchair,       

      with chart. Condition: stable. No special radiology studies were completed. Property        

      removed, inventory done, secured in belongings bag- placed in locked locker.                

20:47 Patient left the ED.                                                                    slm 

                                                                                                  

Signatures:                                                                                       

Samm Cruz, Security Aide           Sectejalf                                                  

Carolina Nichols, PSA                PSA  ml4                                                  

Marlin Urias Brian, MD MD   br1                                                  

Leena Granados, JACEY                    RN   Thor Muir Theresa, PCA                    PCA  tmm1                                                 

Negro, Mercy, PCA                      PCA  elp                                                  

Adelita Kelly,LPN                  LPN  slm                                                  

Marylin Epperson                                                  

                                                                                                  

**************************************************************************************************



*** Chart Complete ***
MTDD

## 2017-01-30 NOTE — EDDOCDS
Physician Documentation                                                                           

[facility]                                                                         

Name: Cathi Andrew                                                                                 

Age: 41 yrs                                                                                       

Sex: Male                                                                                         

: 1975                                                                                   

MRN: [id number]                                                                                     

Arrival Date: 2017                                                                          

Time: 17:18                                                                                       

Account#: [id number]                                                                              

Bed [facility]                                                                                          

Private MD: NO PRIMARY PHYSICIAN, .                                                               

Disposition:                                                                                      

17 20:26 Hospitalization ordered by Smith cAevedo for Inpatient Admission. Preliminary    

diagnosis is Adjustment disorder with depressed mood.                                           

- Bed requested for Admit.                                                                        

- Status is Inpatient Admission.                                                              slm 

- Condition is Stable.                                                                            

- Problem is new.                                                                                 

- Symptoms are unchanged.                                                                         

                                                                                                  

                                                                                                  

                                                                                                  

Historical:                                                                                       

- Allergies: PENICILLINS (Hives);                                                                 

- Home Meds:                                                                                      

1. none                                                                                         

- PMHx: Depression; Degenerative disc disease; herniated discs C6-7;                              

- PSHx: left eye FB;                                                                              

- Social history: Smoking status: Patient uses tobacco products, current every day                

smoker. No barriers to communication noted, The patient speaks fluent English.                  

- Family history: Not pertinent.                                                                  

- : The pt / caregiver states he / she is not on anticoagulants. Home medication list             

is obtained from the patient.                                                                   

- Exposure Risk Screening:: None identified.                                                      

                                                                                                  

                                                                                                  

Vital Signs:                                                                                      

                                                                                             

17:19  / 92; Pulse 112; Resp 20; Temp 96.0; Pulse Ox 99% ; Weight 81.65 kg / 180.01     elp 

      lbs; Height 6 ft. 2 in. (187.96 cm); Pain 0/10;                                             

20:28  / 91; Pulse 83; Resp 18; Temp 97.5(O); Pulse Ox 99% ; Pain 0/10;                 mas 

17:19 Body Mass Index 23.11 (81.65 kg, 187.96 cm)                                             elp 

                                                                                                  

MDM:                                                                                              

17:31 REGULAR DIET PLASTIC WARE+DIET ordered.                                                 EDMS

17:38 Consult PFS/PSA/ ordered.                                                  br1 

17:38 Consult PFS/PSA/: Patient's case requires discussion with on-call          br1 

      Psychiatrist ordered.                                                                       

17:38 PSA/PFS to call Nursing Supervisor, to enter patient data [place] Safe [place] if patient    br1 

      involuntarily admitted or transferred for SI or HI ordered.                                 

17:38 Confirm accurate psychiatric medication list and times of last dosage ordered.          br1 

17:38 Detain Pt Until Medically/PFS Cleared ordered.                                          br1 

17:40 Ethyl Alcohol (ethanol) Ordered.                                                        EDMS

18:39 Acetaminophen Level Ordered.                                                            EDMS

18:39 Basic Metabolic Profile Ordered.                                                        EDMS

18:39 Complete Blood Count Ordered.                                                           EDMS

18:39 Drug Eval Toxicology ED Only Ordered.                                                   EDMS

18:39 Liver Profile Ordered.                                                                  EDMS

18:39 Salicylate Level Ordered.                                                               EDMS

18:39 Thyroid Stimulating Hormone Ordered.                                                    EDMS

19:51 Complete Blood Count Reviewed.                                                          br1 

19:51 Drug Eval Toxicology ED Only Reviewed.                                                  br1 

20:08 Financial registration complete.                                                        zo  

20:10 Consult PFS/PSA/ complete.                                                 ml4 

20:10 Consult PFS/PSA/: Patient's case requires discussion with on-call          ml4 

      Psychiatrist complete.                                                                      

20:10 PSA/PFS to call Nursing Supervisor, to [place] patient data [place] Safe [place] if patient    ml4 

      involuntarily admitted or transferred for SI or HI complete.                                

20:13 Admit to [facility]: ordered.                                                                 EDMS

20:14 REGULAR DIET ordered.                                                                   EDMS

20:15 [facility] Legal paperwork was scanned into [employer] and attached to record.                    ml4 

20:21 Acetaminophen Level Reviewed.                                                           br1 

20:21 Basic Metabolic Profile Reviewed.                                                       br1 

20:21 Salicylate Level Reviewed.                                                              br1 

20:21 Liver Profile Reviewed.                                                                 br1 

20:21 Thyroid Stimulating Hormone Reviewed.                                                   br1 

20:45 ETHYL ALCOHOL (ETHANOL) Ordered.                                                        EDMS

21:06 [facility] Payment Agreement was scanned into [employer] and attached to record.               zo  

                                                                                             

20:56 T-Sheet-- Draft Copy was scanned into [employer] and attached to record.                   klr 

                                                                                                  

Signatures:                                                                                       

Dispatcher [employer]                           EDMS                                                 

Carolina Nichols, PSA                PSA  ml4                                                  

Malrin Urias Brian, MD MD   br1                                                  

Leena Granados, JACEY                    RN   Adelita Saldana LPN LPN slm Redder, Kathie klr                                                  

                                                                                                  

The chart was reviewed and I authenticate all verbal orders and agree with the evaluation and 
treatment provided.Corrections: (The following items were deleted from the chart)

                                                                                             

17:42 17:40 ACETAMINOPHEN LEVEL+LAB ordered. EDMS                                             EDMS

17:42 17:40 BASIC METABOLIC PROFILE+LAB ordered. EDMS                                         EDMS

17:42 17:40 COMPLETE BLOOD COUNT+LAB ordered. EDMS                                            EDMS

17:42 17:40 DRUG EVAL TOXICOLOGY ED ONLY+LAB ordered. EDMS                                    EDMS

                                                                                                  

Attachments:                                                                                      

21:06 [facility] Payment Agreement                                                                zo  

                                                                                             

20:56 T-Sheet-- Draft Copy                                                                    klr 

                                                                                                  

**************************************************************************************************



*** Chart Complete ***
MTDD

## 2017-01-30 NOTE — HPE
DATE OF ADMISSION:

 

HISTORY OF PRESENT ILLNESS:   Please refer to psychiatric history and evaluation

for further details on this admission.  This examination and history is intended

for medical issues, which may need treatment, followup or consultation on this

41-year-old male.

 

PRIMARY CARE PROVIDER: Currently he has none.

 

ALLERGIES: PENICILLIN.

 

SOCIAL HISTORY: He is . Her lives alone. He stays at home. ETOH, he has

a history of alcohol abuse. He states he only drinks once or twice a year now.

Smoking, he smokes one pack of cigarettes per day. Recreational drug use, he was

using methamphetamine (meth) daily, states he has not used it for two months.

Occasionally smokes marijuana.

 

HOME MEDICATIONS: None.

 

PAST MEDICAL HISTORY: Degenerative joint disease.

 

PAST SURGICAL HISTORY: Removal of foreign body left eye.

 

FAMILY HISTORY: Mother has he states alcohol problems.

 

LABORATORY STUDIES: CBC was normal. Electrolytes were normal. BUN 20, creatinine

1.19, urine toxicology was negative.

 

REVIEW OF SYSTEMS: Ten system review was done. Other than occasional back pain he

had no complaints.

 

PHYSICAL EXAMINATION:

GENERAL: 41-year-old cooperative male in no acute distress. The patient is awake,

alert and oriented times three.

VITAL SIGNS: Height 74 inches, weight 81.6 kilograms, Body maximum index (BMI)

23.1, blood pressure 140/68, pulse 78, respirations 16, temperature 96.9.

HEENT: Pupils equal, round, reactive to light. Extraocular muscles intact.

Cornea and sclerae clear.  Conjunctiva is normal.  No facial asymmetry.  Pharynx,

tongue and gum is pink and moist.  Tongue is midline.

NECK: Neck is supple without lymphadenopathy.  No thyromegaly.  No goiter.

CHEST: Clear to auscultation without wheeze or retraction.

HEART: Heart is regular.

ABDOMEN: Benign.  Bowel sounds positive.

Genitourinary ()/Rectal:  Not done.

Extremities:  Equal strength with full range of motion. No clubbing, cyanosis, or

edema. Peripheral pulses are equal and palpable bilaterally.

SKIN: Warm and dry.

 

IMPRESSION AND PLAN:

1. Psychiatric plan per psychiatry.

2. Nicotine patch available.

3. Degenerative joint disease currently stable.

4. No acute medical issues.

## 2017-01-31 VITALS — DIASTOLIC BLOOD PRESSURE: 66 MMHG | SYSTOLIC BLOOD PRESSURE: 144 MMHG

## 2017-01-31 VITALS — DIASTOLIC BLOOD PRESSURE: 65 MMHG | SYSTOLIC BLOOD PRESSURE: 135 MMHG

## 2017-01-31 RX ADMIN — SERTRALINE HYDROCHLORIDE SCH MG: 50 TABLET ORAL at 08:43

## 2017-02-01 VITALS — DIASTOLIC BLOOD PRESSURE: 75 MMHG | SYSTOLIC BLOOD PRESSURE: 131 MMHG

## 2017-02-01 VITALS — SYSTOLIC BLOOD PRESSURE: 119 MMHG | DIASTOLIC BLOOD PRESSURE: 65 MMHG
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
## 2017-02-02 VITALS — SYSTOLIC BLOOD PRESSURE: 128 MMHG | DIASTOLIC BLOOD PRESSURE: 63 MMHG

## 2017-02-02 VITALS — SYSTOLIC BLOOD PRESSURE: 145 MMHG | DIASTOLIC BLOOD PRESSURE: 70 MMHG

## 2017-02-02 RX ADMIN — DIVALPROEX SODIUM SCH MG: 250 TABLET, DELAYED RELEASE ORAL at 23:01

## 2017-02-02 NOTE — IPNPDOC
[facility] Progress Note


Progress Note


DATE OF SERVICE: 1/31/17








Subjective:


-----------


Patient reports improving mood. He reports fair sleep last night. Patient 

expresses happiness with having a dx 


which explains why he behaves and experiences the mood issues he does. Patient 

reports his inability to 


express his feelings, his constant feeling of being on guard, his paranoia is 

understandable now. He reports 


having feelings of guilt, but was counseled to not berate himself for a 

behavior he did unintentionally, symptomatic


of PTSD. Patient acknowledged understanding. Patient is med compliance and 

denies s/e's. Patient does indorse


increased anxiety on the new unit and reports increased frequency of cravings 

to use illicit substances. He reports no


issues on the unit with peers or staff. He reports no HA, CP, Abd pain, or N/V/C

/D.








Objective:


----------


VITAL SIGNS: See below.





NEW TEST RESULTS: None





CURRENT MEDICATIONS: See below.





MENTAL STATUS EXAMINATION: Patient is a 41-year-old  male who appears 

stated age, calm and cooperative, in no acute distress


Speech: Is regular rate and rhythm, spontaneous


Thought processes: Linear, Goal directed.


Thought content: Appreciative of care received at [facility], mildly anxious on 

starting new in substance rehab program


Description of abnormal or psychotic thoughts: No perceptual issues noted or 

reported.


Judgment: fair.


Insight: fair


Orientation to time, place and person.


Recent and remote memory: Intact.


Immediate short-term and long-term memory is: intact.


Attention span and concentration: fair.


Language: Normal.


Fund of knowledge: good.


Mood: Euthymic


Affect: Bright








Assessment:


-------------


PTSD


Amphetamine use d/o, severe


Cannabis use d/o








Plan:


-----


- Continue Venlafaxine 75 mg by mouth daily for PTSD symptoms. 


- Continue Depakote 250mg po BID for PTSD mood/anger symptoms. 





TIME SPENT: [30] minutes.





Vital Signs





 Vital Signs








  Date Time  Temp Pulse Resp B/P Pulse Ox O2 Delivery O2 Flow Rate FiO2


 


2/2/17 18:00 97.3 75 16 145/70    











Current Medications





 Current Medications








 Medications


  (Trade)  Dose


 Ordered  Sig/Tc


 Route


 PRN Reason  Start Time


 Stop Time Status Last Admin


Dose Admin


 


 Acetaminophen


  (Tylenol Tab)  650 mg  Q6HP  PRN


 PO


 HEADACHE or DISCOMFORT  1/28/17 20:15


 2/27/17 20:14  1/31/17 15:00


 


 


 Al Hydrox/Mg


 Hydrox/Simethicone


  (Mylanta)  30 ml  Q4HP  PRN


 PO


 HEARTBURN/INDIGESTION  1/28/17 20:15


 2/27/17 20:14   


 


 


 Bupropion HCl


  (Wellbutrin)  75 mg  BID


 PO


   2/1/17 08:00


 2/1/17 10:03 DC 2/1/17 07:39


 


 


 Bupropion HCl


  (Wellbutrin)  75 mg  BID


 PO


   2/1/17 21:00


 2/1/17 21:00 DC  


 


 


 Diphenhydramine


 HCl


  (Benadryl)  50 mg  Q4HP  PRN


 PO


 ITCHING  2/1/17 19:30


 3/3/17 19:29  2/1/17 20:22


 


 


 Home Med


  (Med Rec


 Complete!)    ASDIRECTED


 XX


   1/28/17 21:00


 1/28/17 21:00 DC  


 


 


 Magnesium


 Hydroxide


  (Milk Of


 Magnesia)  30 ml  DAILYPRN  PRN


 PO


 CONSTIPATION  1/28/17 20:15


 2/27/17 20:14   


 


 


 Nicotine


  (Nicoderm Cq


 21mg)  1 patch  DAILY


 TD


   1/29/17 09:00


 1/29/17 09:00 DC  


 


 


 Quetiapine


 Fumarate


  (SEROquel)  100 mg  QHS


 PO


   1/31/17 21:00


 2/1/17 19:25 DC 1/31/17 21:23


 


 


 Sertraline HCl


  (Zoloft)  50 mg  DAILY


 PO


   1/29/17 09:00


 1/31/17 20:31 DC 1/31/17 08:43


 


 


 Trazodone HCl


  (Desyrel)  50 mg  QHSP  PRN


 PO


 INSOMNIA  1/28/17 20:15


 1/31/17 20:31 DC 1/29/17 21:07


 











Allergies


Coded Allergies:  


     Penicillins (Verified  Allergy, Intermediate, HIVES, 4/4/13)


     Penicillins Cross Reactors (Verified  Allergy, Intermediate, HIVES, 4/4/13)








SUKHWINDER RICHARDSON MD Feb 2, 2017 22:53

## 2017-02-03 VITALS — DIASTOLIC BLOOD PRESSURE: 68 MMHG | SYSTOLIC BLOOD PRESSURE: 141 MMHG

## 2017-02-03 VITALS — DIASTOLIC BLOOD PRESSURE: 93 MMHG | SYSTOLIC BLOOD PRESSURE: 145 MMHG

## 2017-02-03 RX ADMIN — VENLAFAXINE HYDROCHLORIDE SCH MG: 75 CAPSULE, EXTENDED RELEASE ORAL at 08:26

## 2017-02-03 RX ADMIN — DIVALPROEX SODIUM SCH MG: 250 TABLET, DELAYED RELEASE ORAL at 20:17

## 2017-02-03 NOTE — IPNPDOC
[facility] Progress Note


Progress Note


DATE OF SERVICE: 2/2/17





Subjective:


-----------


Patient reports his mood as "good" today. Patient reports improving mood, some 

anxiety today. Patient 


reports he is apprehensive about calling his mom, afraid she will drop his 

mood. Patient wants to inform


her of his application to inpatient rehab programs. He reports no other issues. 

Patient is more social with


peers, visible more in the dayroom.  He reports fair appetite. He reports fair 

sleep last night. Patient reports 


ongoing cravings. Patient is med compliance and denies s/e's. Patient denies SI/

HI and AH/VH.








Objective:


----------


VITAL SIGNS: See below.





NEW TEST RESULTS: None





CURRENT MEDICATIONS: See below.





MENTAL STATUS EXAMINATION: Patient is a 41-year-old  male who appears 

stated age, calm and cooperative, in no acute distress


Speech: Is regular rate and rhythm, spontaneous


Thought processes: Linear, Goal directed.


Thought content: apprehensive about calling his mom, afraid she will drop his 

mood


Description of abnormal or psychotic thoughts: No perceptual issues noted or 

reported.


Judgment: fair.


Insight: fair


Orientation to time, place and person.


Recent and remote memory: Intact.


Immediate short-term and long-term memory is: intact.


Attention span and concentration: fair.


Language: Normal.


Fund of knowledge: good.


Mood: Euthymic


Affect: broad








Assessment:


-------------


PTSD


Amphetamine use d/o, severe


Cannabis use d/o








Plan:


-----


- Continue Venlafaxine 75 mg by mouth daily for PTSD symptoms. 


- Continue Depakote 250mg po BID for PTSD mood/anger symptoms. 





TIME SPENT: 30 minutes.





Vital Signs





 Vital Signs








  Date Time  Temp Pulse Resp B/P Pulse Ox O2 Delivery O2 Flow Rate FiO2


 


2/3/17 06:43 96.3 75 16 141/68    











Current Medications





 Current Medications








 Medications


  (Trade)  Dose


 Ordered  Sig/Tc


 Route


 PRN Reason  Start Time


 Stop Time Status Last Admin


Dose Admin


 


 Acetaminophen


  (Tylenol Tab)  650 mg  Q6HP  PRN


 PO


 HEADACHE or DISCOMFORT  1/28/17 20:15


 2/27/17 20:14  1/31/17 15:00


 


 


 Al Hydrox/Mg


 Hydrox/Simethicone


  (Mylanta)  30 ml  Q4HP  PRN


 PO


 HEARTBURN/INDIGESTION  1/28/17 20:15


 2/27/17 20:14   


 


 


 Bupropion HCl


  (Wellbutrin)  75 mg  BID


 PO


   2/1/17 08:00


 2/1/17 10:03 DC 2/1/17 07:39


 


 


 Bupropion HCl


  (Wellbutrin)  75 mg  BID


 PO


   2/1/17 21:00


 2/1/17 21:00 DC  


 


 


 Diphenhydramine


 HCl


  (Benadryl)  50 mg  Q4HP  PRN


 PO


 ITCHING  2/1/17 19:30


 3/3/17 19:29  2/1/17 20:22


 


 


 Divalproex Sodium


  (Depakote)  250 mg  QHS


 PO


   2/2/17 21:00


 3/4/17 20:59  2/2/17 23:01


 


 


 Home Med


  (Med Rec


 Complete!)    ASDIRECTED


 XX


   1/28/17 21:00


 1/28/17 21:00 DC  


 


 


 Magnesium


 Hydroxide


  (Milk Of


 Magnesia)  30 ml  DAILYPRN  PRN


 PO


 CONSTIPATION  1/28/17 20:15


 2/27/17 20:14   


 


 


 Nicotine


  (Nicoderm Cq


 21mg)  1 patch  DAILY


 TD


   1/29/17 09:00


 1/29/17 09:00 DC  


 


 


 Quetiapine


 Fumarate


  (SEROquel)  100 mg  QHS


 PO


   1/31/17 21:00


 2/1/17 19:25 DC 1/31/17 21:23


 


 


 Sertraline HCl


  (Zoloft)  50 mg  DAILY


 PO


   1/29/17 09:00


 1/31/17 20:31 DC 1/31/17 08:43


 


 


 Trazodone HCl


  (Desyrel)  50 mg  QHSP  PRN


 PO


 INSOMNIA  1/28/17 20:15


 1/31/17 20:31 DC 1/29/17 21:07


 


 


 Venlafaxine HCl


  (Effexor **Xr**)  75 mg  DAILY


 PO


   2/3/17 09:00


 3/5/17 08:59  2/3/17 08:26


 











Allergies


Coded Allergies:  


     Penicillins (Verified  Allergy, Intermediate, HIVES, 4/4/13)


     Penicillins Cross Reactors (Verified  Allergy, Intermediate, HIVES, 4/4/13)








SUKHWINDER RICHARDSON MD Feb 3, 2017 11:59


 


 


 Sertraline HCl


  (Zoloft)  50 mg  DAILY


 PO


   1/29/17 09:00


 1/31/17 20:31 DC 1/31/17 08:43


 


 


 Trazodone HCl


  (Desyrel)  50 mg  QHSP  PRN


 PO


 INSOMNIA  1/28/17 20:15


 1/31/17 20:31 DC 1/29/17 21:07


 


 


 Venlafaxine HCl


  (Effexor **Xr**)  75 mg  DAILY


 PO


   2/3/17 09:00


 3/5/17 08:59  2/3/17 08:26


 











Allergies


Coded Allergies:  


     Penicillins (Verified  Allergy, Intermediate, HIVES, 4/4/13)


     Penicillins Cross Reactors (Verified  Allergy, Intermediate, HIVES, 4/4/13)








SUKHWINDER RICHARDSON MD Feb 3, 2017 11:59

## 2017-02-03 NOTE — IPNPDOC
[facility] Progress Note


Progress Note


DATE OF SERVICE: 2/1/17











Subjective:


-----------


Patient reports his mood as "pretty good" today. Patient reports applying and 

hopes to be admitted prior to


discharge is his focus. He reports fair appetite. He reports fair sleep last 

night. Patient reports ongoing cravings, 


triggered by thoughts of his mother and not reaching his potential in life. 

Patient is med compliance and denies s/e's. 


Patient denies SI/HI and AH/VH.








Objective:


----------


VITAL SIGNS: See below.





NEW TEST RESULTS: None





CURRENT MEDICATIONS: See below.





MENTAL STATUS EXAMINATION: Patient is a 41-year-old  male who appears 

stated age, calm and cooperative, in no acute distress


Speech: Is regular rate and rhythm, spontaneous


Thought processes: Linear, Goal directed.


Thought content: focused on admission into an inpt [facility] 


Description of abnormal or psychotic thoughts: No perceptual issues noted or 

reported.


Judgment: fair.


Insight: fair


Orientation to time, place and person.


Recent and remote memory: Intact.


Immediate short-term and long-term memory is: intact.


Attention span and concentration: fair.


Language: Normal.


Fund of knowledge: good.


Mood: Euthymic


Affect: broad








Assessment:


-------------


PTSD


Amphetamine use d/o, severe


Cannabis use d/o








Plan:


-----


- Continue Venlafaxine 75 mg by mouth daily for PTSD symptoms. 


- Continue Depakote 250mg po BID for PTSD mood/anger symptoms. 





TIME SPENT: [30] minutes.





Vital Signs





 Vital Signs








  Date Time  Temp Pulse Resp B/P Pulse Ox O2 Delivery O2 Flow Rate FiO2


 


2/3/17 06:43 96.3 75 16 141/68    











Current Medications





 Current Medications








 Medications


  (Trade)  Dose


 Ordered  Sig/Tc


 Route


 PRN Reason  Start Time


 Stop Time Status Last Admin


Dose Admin


 


 Acetaminophen


  (Tylenol Tab)  650 mg  Q6HP  PRN


 PO


 HEADACHE or DISCOMFORT  1/28/17 20:15


 2/27/17 20:14  1/31/17 15:00


 


 


 Al Hydrox/Mg


 Hydrox/Simethicone


  (Mylanta)  30 ml  Q4HP  PRN


 PO


 HEARTBURN/INDIGESTION  1/28/17 20:15


 2/27/17 20:14   


 


 


 Bupropion HCl


  (Wellbutrin)  75 mg  BID


 PO


   2/1/17 08:00


 2/1/17 10:03 DC 2/1/17 07:39


 


 


 Bupropion HCl


  (Wellbutrin)  75 mg  BID


 PO


   2/1/17 21:00


 2/1/17 21:00 DC  


 


 


 Diphenhydramine


 HCl


  (Benadryl)  50 mg  Q4HP  PRN


 PO


 ITCHING  2/1/17 19:30


 3/3/17 19:29  2/1/17 20:22


 


 


 Divalproex Sodium


  (Depakote)  250 mg  QHS


 PO


   2/2/17 21:00


 3/4/17 20:59  2/2/17 23:01


 


 


 Home Med


  (Med Rec


 Complete!)    ASDIRECTED


 XX


   1/28/17 21:00


 1/28/17 21:00 DC  


 


 


 Magnesium


 Hydroxide


  (Milk Of


 Magnesia)  30 ml  DAILYPRN  PRN


 PO


 CONSTIPATION  1/28/17 20:15


 2/27/17 20:14   


 


 


 Nicotine


  (Nicoderm Cq


 21mg)  1 patch  DAILY


 TD


   1/29/17 09:00


 1/29/17 09:00 DC  


 


 


 Quetiapine


 Fumarate


  (SEROquel)  100 mg  QHS


 PO


   1/31/17 21:00


 2/1/17 19:25 DC 1/31/17 21:23


 


 


 Sertraline HCl


  (Zoloft)  50 mg  DAILY


 PO


   1/29/17 09:00


 1/31/17 20:31 DC 1/31/17 08:43


 


 


 Trazodone HCl


  (Desyrel)  50 mg  QHSP  PRN


 PO


 INSOMNIA  1/28/17 20:15


 1/31/17 20:31 DC 1/29/17 21:07


 


 


 Venlafaxine HCl


  (Effexor **Xr**)  75 mg  DAILY


 PO


   2/3/17 09:00


 3/5/17 08:59  2/3/17 08:26


 











Allergies


Coded Allergies:  


     Penicillins (Verified  Allergy, Intermediate, HIVES, 4/4/13)


     Penicillins Cross Reactors (Verified  Allergy, Intermediate, HIVES, 4/4/13)








SUKHWINDER RICHARDSON MD Feb 3, 2017 11:59





Allergies


Coded Allergies:  


     Penicillins (Verified  Allergy, Intermediate, HIVES, 4/4/13)


     Penicillins Cross Reactors (Verified  Allergy, Intermediate, HIVES, 4/4/13)








SUKHWINDER RICHARDSON MD Feb 3, 2017 11:59

## 2017-02-03 NOTE — IPNPDOC
[facility] Progress Note


Progress Note


DATE OF SERVICE: 2/3/17








Subjective:


-----------


Patient reports his mood as "pretty good" today. Patient reports much improved 

mood and anxiety today. Patient 


reports he will likely discharge to [facility] inpatient [facility] on Tueday 2/7/ 17. Patient reports being


happy with  insights / coping skills gained from groups. Patient continues to 

be more social with peers, visible


more in the dayroom.  He reports fair appetite. He reports fair sleep last 

night. Patient reports ongoing cravings. 


Patient is med compliance and denies s/e's. Patient denies SI/HI and AH/VH.








Objective:


----------


VITAL SIGNS: See below.





NEW TEST RESULTS: None





CURRENT MEDICATIONS: See below.





MENTAL STATUS EXAMINATION: Patient is a 41-year-old  male who appears 

stated age, calm and cooperative, in no acute distress


Speech: Is regular rate and rhythm, spontaneous


Thought processes: Linear, Goal directed.


Thought content: happy with  insights / coping skills gained from groups


Description of abnormal or psychotic thoughts: No perceptual issues noted or 

reported.


Judgment: fair.


Insight: fair


Orientation to time, place and person.


Recent and remote memory: Intact.


Immediate short-term and long-term memory is: intact.


Attention span and concentration: fair.


Language: Normal.


Fund of knowledge: good.


Mood: Euthymic


Affect: broad








Assessment:


-------------


PTSD


Amphetamine use d/o, severe


Cannabis use d/o








Plan:


-----


- Continue Venlafaxine 75 mg by mouth daily for PTSD symptoms. 


- Continue Depakote 250mg po BID for PTSD mood/anger symptoms. 


- Observe patient over the weekend at current med doses as patient displaying 

benefit to meds.





Estimated date of discharge: Tuesday 2/7/17





TIME SPENT: 30 minutes.





Vital Signs





 Vital Signs








  Date Time  Temp Pulse Resp B/P Pulse Ox O2 Delivery O2 Flow Rate FiO2


 


2/3/17 06:43 96.3 75 16 141/68    











Current Medications





 Current Medications








 Medications


  (Trade)  Dose


 Ordered  Sig/Tc


 Route


 PRN Reason  Start Time


 Stop Time Status Last Admin


Dose Admin


 


 Acetaminophen


  (Tylenol Tab)  650 mg  Q6HP  PRN


 PO


 HEADACHE or DISCOMFORT  1/28/17 20:15


 2/27/17 20:14  1/31/17 15:00


 


 


 Al Hydrox/Mg


 Hydrox/Simethicone


  (Mylanta)  30 ml  Q4HP  PRN


 PO


 HEARTBURN/INDIGESTION  1/28/17 20:15


 2/27/17 20:14   


 


 


 Bupropion HCl


  (Wellbutrin)  75 mg  BID


 PO


   2/1/17 08:00


 2/1/17 10:03 DC 2/1/17 07:39


 


 


 Bupropion HCl


  (Wellbutrin)  75 mg  BID


 PO


   2/1/17 21:00


 2/1/17 21:00 DC  


 


 


 Diphenhydramine


 HCl


  (Benadryl)  50 mg  Q4HP  PRN


 PO


 ITCHING  2/1/17 19:30


 3/3/17 19:29  2/1/17 20:22


 


 


 Divalproex Sodium


  (Depakote)  250 mg  QHS


 PO


   2/2/17 21:00


 3/4/17 20:59  2/2/17 23:01


 


 


 Home Med


  (Med Rec


 Complete!)    ASDIRECTED


 XX


   1/28/17 21:00


 1/28/17 21:00 DC  


 


 


 Magnesium


 Hydroxide


  (Milk Of


 Magnesia)  30 ml  DAILYPRN  PRN


 PO


 CONSTIPATION  1/28/17 20:15


 2/27/17 20:14   


 


 


 Nicotine


  (Nicoderm Cq


 21mg)  1 patch  DAILY


 TD


   1/29/17 09:00


 1/29/17 09:00 DC  


 


 


 Quetiapine


 Fumarate


  (SEROquel)  100 mg  QHS


 PO


   1/31/17 21:00


 2/1/17 19:25 DC 1/31/17 21:23


 


 


 Sertraline HCl


  (Zoloft)  50 mg  DAILY


 PO


   1/29/17 09:00


 1/31/17 20:31 DC 1/31/17 08:43


 


 


 Trazodone HCl


  (Desyrel)  50 mg  QHSP  PRN


 PO


 INSOMNIA  1/28/17 20:15


 1/31/17 20:31 DC 1/29/17 21:07


 


 


 Venlafaxine HCl


  (Effexor **Xr**)  75 mg  DAILY


 PO


   2/3/17 09:00


 3/5/17 08:59  2/3/17 08:26


 











Allergies


Coded Allergies:  


     Penicillins (Verified  Allergy, Intermediate, HIVES, 4/4/13)


     Penicillins Cross Reactors (Verified  Allergy, Intermediate, HIVES, 4/4/13)








WHITE,SUKHWINDER G. MD Feb 3, 2017 12:00

## 2017-02-04 VITALS — DIASTOLIC BLOOD PRESSURE: 77 MMHG | SYSTOLIC BLOOD PRESSURE: 173 MMHG

## 2017-02-04 VITALS — DIASTOLIC BLOOD PRESSURE: 62 MMHG | SYSTOLIC BLOOD PRESSURE: 121 MMHG

## 2017-02-04 RX ADMIN — DIVALPROEX SODIUM SCH MG: 250 TABLET, DELAYED RELEASE ORAL at 20:03

## 2017-02-04 RX ADMIN — VENLAFAXINE HYDROCHLORIDE SCH MG: 75 CAPSULE, EXTENDED RELEASE ORAL at 08:26

## 2017-02-05 VITALS — DIASTOLIC BLOOD PRESSURE: 79 MMHG | SYSTOLIC BLOOD PRESSURE: 150 MMHG

## 2017-02-05 VITALS — DIASTOLIC BLOOD PRESSURE: 67 MMHG | SYSTOLIC BLOOD PRESSURE: 122 MMHG

## 2017-02-05 RX ADMIN — VENLAFAXINE HYDROCHLORIDE SCH MG: 75 CAPSULE, EXTENDED RELEASE ORAL at 08:27

## 2017-02-05 RX ADMIN — DIVALPROEX SODIUM SCH MG: 250 TABLET, DELAYED RELEASE ORAL at 20:39

## 2017-02-06 VITALS — DIASTOLIC BLOOD PRESSURE: 67 MMHG | SYSTOLIC BLOOD PRESSURE: 139 MMHG

## 2017-02-06 VITALS — SYSTOLIC BLOOD PRESSURE: 127 MMHG | DIASTOLIC BLOOD PRESSURE: 60 MMHG

## 2017-02-06 RX ADMIN — VENLAFAXINE HYDROCHLORIDE SCH MG: 75 CAPSULE, EXTENDED RELEASE ORAL at 08:19
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
## 2017-02-06 NOTE — IPNPDOC
[facility] Progress Note


Progress Note


DATE OF SERVICE: 2/6/17








Subjective:


-----------


Patient reports his mood as "real good" today.  Patient is full in affect and 

smiles at times during the interview. Yany reports being


anxious about new living arrangements and new set of peers as patient will 

discharge and be admitted to an inpatient [facility] tomorrow.


He reports no other concerns. Patient reports fair appetite. He reports fair 

sleep last night. Patient reports ongoing cravings. 


Patient is med compliance and denies s/e's. Patient denies SI/HI and AH/VH.








Objective:


----------


VITAL SIGNS: See below.





NEW TEST RESULTS: None





CURRENT MEDICATIONS: See below.





MENTAL STATUS EXAMINATION: Patient is a 41-year-old  male who appears 

stated age, calm and cooperative, in no acute distress


Speech: Is regular rate and rhythm, spontaneous


Thought processes: Linear, Goal directed.


Thought content: anxious about new living arrangements and new set of peers as 

patient will discharge and be admitted to and inpatient [facility].


Description of abnormal or psychotic thoughts: No perceptual issues noted or 

reported.


Judgment: fair.


Insight: fair


Orientation to time, place and person.


Recent and remote memory: Intact.


Immediate short-term and long-term memory is: intact.


Attention span and concentration: fair.


Language: Normal.


Fund of knowledge: good.


Mood: Euthymic


Affect: full








Assessment:


-------------


PTSD


Amphetamine use d/o, severe


Cannabis use d/o








Plan:


-----


- Continue Venlafaxine 75 mg by mouth daily for PTSD symptoms. 


- Increase Depakote from 250mg to 500mg po QHS for PTSD mood/anger symptoms. 





Estimated date of discharge: Tuesday 2/7/17





TIME SPENT: 30 minutes.








Vital Signs





 Vital Signs








  Date Time  Temp Pulse Resp B/P Pulse Ox O2 Delivery O2 Flow Rate FiO2


 


2/6/17 06:30 97.4 61 18 139/67    











Current Medications





 Current Medications








 Medications


  (Trade)  Dose


 Ordered  Sig/Tc


 Route


 PRN Reason  Start Time


 Stop Time Status Last Admin


Dose Admin


 


 Acetaminophen


  (Tylenol Tab)  650 mg  Q6HP  PRN


 PO


 HEADACHE or DISCOMFORT  1/28/17 20:15


 2/27/17 20:14  1/31/17 15:00


 


 


 Al Hydrox/Mg


 Hydrox/Simethicone


  (Mylanta)  30 ml  Q4HP  PRN


 PO


 HEARTBURN/INDIGESTION  1/28/17 20:15


 2/27/17 20:14   


 


 


 Bupropion HCl


  (Wellbutrin)  75 mg  BID


 PO


   2/1/17 08:00


 2/1/17 10:03 DC 2/1/17 07:39


 


 


 Bupropion HCl


  (Wellbutrin)  75 mg  BID


 PO


   2/1/17 21:00


 2/1/17 21:00 DC  


 


 


 Diphenhydramine


 HCl


  (Benadryl)  50 mg  Q4HP  PRN


 PO


 ITCHING  2/1/17 19:30


 3/3/17 19:29  2/1/17 20:22


 


 


 Divalproex Sodium


  (Depakote)  250 mg  QHS


 PO


   2/2/17 21:00


 3/4/17 20:59  2/5/17 20:39


 


 


 Home Med


  (Med Rec


 Complete!)    ASDIRECTED


 XX


   1/28/17 21:00


 1/28/17 21:00 DC  


 


 


 Magnesium


 Hydroxide


  (Milk Of


 Magnesia)  30 ml  DAILYPRN  PRN


 PO


 CONSTIPATION  1/28/17 20:15


 2/27/17 20:14   


 


 


 Nicotine


  (Nicoderm Cq


 21mg)  1 patch  DAILY


 TD


   1/29/17 09:00


 1/29/17 09:00 DC  


 


 


 Quetiapine


 Fumarate


  (SEROquel)  100 mg  QHS


 PO


   1/31/17 21:00


 2/1/17 19:25 DC 1/31/17 21:23


 


 


 Sertraline HCl


  (Zoloft)  50 mg  DAILY


 PO


   1/29/17 09:00


 1/31/17 20:31 DC 1/31/17 08:43


 


 


 Trazodone HCl


  (Desyrel)  50 mg  QHSP  PRN


 PO


 INSOMNIA  1/28/17 20:15


 1/31/17 20:31 DC 1/29/17 21:07


 


 


 Venlafaxine HCl


  (Effexor **Xr**)  75 mg  DAILY


 PO


   2/3/17 09:00


 3/5/17 08:59  2/6/17 08:19


 











Allergies


Coded Allergies:  


     Penicillins (Verified  Allergy, Intermediate, HIVES, 4/4/13)


     Penicillins Cross Reactors (Verified  Allergy, Intermediate, HIVES, 4/4/13)








SUKHWINDER RICHARDSON MD Feb 6, 2017 16:09

## 2017-02-07 VITALS — DIASTOLIC BLOOD PRESSURE: 61 MMHG | SYSTOLIC BLOOD PRESSURE: 121 MMHG

## 2017-02-07 RX ADMIN — VENLAFAXINE HYDROCHLORIDE SCH MG: 75 CAPSULE, EXTENDED RELEASE ORAL at 08:19

## 2017-05-18 ENCOUNTER — HOSPITAL ENCOUNTER (INPATIENT)
Dept: HOSPITAL 53 - M ED | Age: 42
LOS: 6 days | Discharge: HOME | DRG: 753 | End: 2017-05-24
Attending: PSYCHIATRY & NEUROLOGY | Admitting: PSYCHIATRY & NEUROLOGY
Payer: COMMERCIAL

## 2017-05-18 VITALS — SYSTOLIC BLOOD PRESSURE: 135 MMHG | DIASTOLIC BLOOD PRESSURE: 70 MMHG

## 2017-05-18 VITALS — HEIGHT: 74 IN | WEIGHT: 197.98 LBS | BODY MASS INDEX: 25.41 KG/M2

## 2017-05-18 DIAGNOSIS — F32.9: ICD-10-CM

## 2017-05-18 DIAGNOSIS — Z88.0: ICD-10-CM

## 2017-05-18 DIAGNOSIS — F31.9: ICD-10-CM

## 2017-05-18 DIAGNOSIS — F43.10: ICD-10-CM

## 2017-05-18 DIAGNOSIS — Z88.8: ICD-10-CM

## 2017-05-18 DIAGNOSIS — M50.90: ICD-10-CM

## 2017-05-18 DIAGNOSIS — F41.9: ICD-10-CM

## 2017-05-18 DIAGNOSIS — F90.9: ICD-10-CM

## 2017-05-18 DIAGNOSIS — Z79.899: ICD-10-CM

## 2017-05-18 DIAGNOSIS — F39: Primary | ICD-10-CM

## 2017-05-18 LAB
ALBUMIN SERPL BCG-MCNC: 4.3 GM/DL (ref 3.2–5.2)
ALBUMIN/GLOB SERPL: 1.3 {RATIO} (ref 1–1.93)
ALP SERPL-CCNC: 55 U/L (ref 45–117)
ALT SERPL W P-5'-P-CCNC: 24 U/L (ref 12–78)
ANION GAP SERPL CALC-SCNC: 11 MEQ/L (ref 8–16)
AST SERPL-CCNC: 15 U/L (ref 15–37)
BILIRUB CONJ SERPL-MCNC: < 0.1 MG/DL (ref 0–0.2)
BILIRUB SERPL-MCNC: 0.2 MG/DL (ref 0.2–1)
BUN SERPL-MCNC: 14 MG/DL (ref 7–18)
CALCIUM SERPL-MCNC: 8.6 MG/DL (ref 8.5–10.1)
CHLORIDE SERPL-SCNC: 111 MEQ/L (ref 98–107)
CO2 SERPL-SCNC: 22 MEQ/L (ref 21–32)
CREAT SERPL-MCNC: 1.18 MG/DL (ref 0.7–1.3)
ERYTHROCYTE [DISTWIDTH] IN BLOOD BY AUTOMATED COUNT: 12.9 % (ref 11.5–14.5)
GFR SERPL CREATININE-BSD FRML MDRD: > 60 ML/MIN/{1.73_M2} (ref 60–?)
GLUCOSE SERPL-MCNC: 82 MG/DL (ref 70–105)
MCH RBC QN AUTO: 30.1 PG (ref 27–33)
MCHC RBC AUTO-ENTMCNC: 33.8 G/DL (ref 32–36.5)
MCV RBC AUTO: 89 FL (ref 80–96)
METHADONE UR QL SCN: NEGATIVE
PLATELET # BLD AUTO: 369 K/MM3 (ref 150–450)
POTASSIUM SERPL-SCNC: 3.9 MEQ/L (ref 3.5–5.1)
PROT SERPL-MCNC: 7.6 GM/DL (ref 6.4–8.2)
SODIUM SERPL-SCNC: 144 MEQ/L (ref 136–145)
WBC # BLD AUTO: 7.7 K/MM3 (ref 4–10)

## 2017-05-19 VITALS — DIASTOLIC BLOOD PRESSURE: 65 MMHG | SYSTOLIC BLOOD PRESSURE: 121 MMHG

## 2017-05-19 VITALS — SYSTOLIC BLOOD PRESSURE: 138 MMHG | DIASTOLIC BLOOD PRESSURE: 65 MMHG

## 2017-05-19 NOTE — ECGEPIP
Stationary ECG Study

                              [facility]

                                       

                                       Test Date:    2017

Pat Name:     PEPE CARROLL              Department:   

Patient ID:   [id number]                 Room:         [place] [address]

Gender:       M                        Technician:   [place]

:          1975               Requested By: Alva Santiago 

Order Number: [id number]     Reading MD:   Justina Brown

                                 Measurements

Intervals                              Axis          

Rate:         77                       P:            62

FL:           159                      QRS:          79

QRSD:         105                      T:            67

QT:           377                                    

QTc:          427                                    

                           Interpretive Statements

SINUS RHYTHM WITH SINUS ARRHYTHMIA

POSSIBLE LEFT ATRIAL ENLARGEMENT

RATE SLOWER      MORE MARKED EARLY REPOLAR CHANGES

Electronically Signed On 2017 17:11:57 EDT by Justina Brown
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
## 2017-05-19 NOTE — HPEPDOC
Medical History and Physical


Date of Admission


May 18, 2017 at 12:38





History and Physical





PCP: None


ATTENDING: Dr. Cristiano Ureña





HPI: 41yoM [facility] other specified depressive disorder, being 

medically examined today. No acute medical complaints today. Denies any fevers, 

chills, weakness, fatigue, HA, CP, SOB, cough, palpitations, abdominal pain, N/V

/D or changes in bowel or bladder habits.





PMHx: 


Anxiety


Depression


Cervical DDD


Substance use





PSHX:


Left eye foreign body








SOCHX:


Resides in: [facility]


Marital Status: Single


Kids: 2


Employment: Unemployed


Tobacco use: Denies


ETOH: Denies


Illicit Drugs: Methamphetamine, states he last used . Attended 

rehabilitation at [facility].


IV Drug Use: Methamphetamine


Tattoos done unprofessionally: Denies 





FAMHX:


Mother: Alive, well   age  secondary to


Father: Alive, well


Siblings: Alive, well


Children: Alive, well


Unexpected deaths due to medical reasons: None.





ROS:


As noted in HPI, otherwise 11pt ROS of systems reviewed and unremarkable.


                 


PE:      


GEN:  41yoM, appears stated age. Well-nourished, well developed. No acute 

distress. Alert and oriented x 3. Flat affect, avoids answering questions, 

avoids eye contact. 


HEENT: Normocephalic, atraumatic. Pupils are equal, round, and reactive to 

light. Extraocular movements are intact. No nystagmus appreciated. Sclera are 

nonicteric. Conjunctiva without injection. Nose midline. Nasal turbinates 

without bogginess. EACs both patent BL. TMs both visualized and gray with good 

cone of light, no bulging or erythema. No facial asymmetry. Moist mucous 

membranes. Dentition fair. Pharynx pink and moist, no cobblestoning. Neck supple

, trachea midline. No lymphadenopathy or thyromegaly appreciated. 


CHEST: Regular rate and rhythm, +S1, +S2


LUNGS: Clear to auscultation bilaterally. No wheezes, rales, or rhonchi. 

Breathing appears symmetric and easy. Patient is speaking in full sentences. No 

accessory muscle use.


ABD: Round, soft, non-tender, non-distended. +Bowel sounds throughout. No 

rebound or guarding. No costovertebral angle tenderness.


EXT: Pulses 2+ bilaterally dorsalis pedis and radial. No lower extremity edema 

appreciated.


SKIN: Pink, dry, warm. Capillary refill <2sec. No rashes.


NEURO: Alert and oriented x 3. Cranial nerves III-XII are intact. No focal 

deficits appreciated. 





EKG: Pending.








A&P:  41yoM [facility] disorder


1. Psych. Plan per Psychiatry. Obtain baseline EKG to assure the safety of 

psychiatric medications as they can prolong the QT interval.


2. History of cervical DDD. Tylenol as needed. Patient denies any pain at this 

time.


3. History of IVDU. Patient declines HIV/hepatitis screening stating it was 

completed at rehabilitation earlier this year. Patient states this testing was 

negative.


4. Follow up. No Primary Care Provider. Will attempt to establish PCP on 

discharge.


5. Substance use. Per psychiatry. 


6. Staff member Edgar present throughout exam.





Vital Signs





Vital Signs








  Date Time  Temp Pulse Resp B/P (MAP) Pulse Ox O2 Delivery O2 Flow Rate FiO2


 


17 06:27 97.6 56 18 121/65 (83)    


 


17 16:48     96 Room Air  











Laboratory Data


Labs 24H











Item Value  Date Time


 


White Blood Count 7.7 K/mm3 17


 


Red Blood Count 5.27 M/mm3 17


 


Hemoglobin 15.9 g/dl 17


 


Hematocrit 46.9 % 17


 


Mean Corpuscular Volume 89.0 fl 17


 


Mean Corpuscular Hemoglobin 30.1 pg 17


 


Mean Corpuscular Hemoglobin Concent 33.8 g/dl 17


 


Red Cell Distribution Width 12.9 % 17


 


Platelet Count 369 k/mm3 17


 


Sodium Level 144 MEQ/L 17


 


Potassium Level 3.9 MEQ/L 17


 


Chloride Level 111 MEQ/L H 17


 


Carbon Dioxide Level 22 MEQ/L 17


 


Anion Gap 11 MEQ/L 17


 


Blood Urea Nitrogen 14 MG/DL 17


 


Creatinine 1.18 MG/DL 17


 


Glomerular Filtration Rate > 60.0 17


 


Fasting Glucose 82 MG/DL 17


 


Calcium Level 8.6 MG/DL 17


 


Total Bilirubin 0.2 MG/DL 17


 


Direct Bilirubin < 0.1 MG/DL 17


 


Aspartate Amino Transf (AST/SGOT) 15 U/L 17


 


Alanine Aminotransferase (ALT/SGPT) 24 U/L 17


 


Alkaline Phosphatase 55 U/L 17


 


Total Protein 7.6 GM/DL 17


 


Albumin 4.3 GM/DL 17


 


Albumin/Globulin Ratio 1.30 17


 


Thyroid Stimulating Hormone (TSH) 2.770 uIU/ML 17


 


Salicylates Level < 1.7 MG/DL L 17


 


Urine Opiates Screen NEGATIVE 17


 


Urine Methadone Screen NEGATIVE 17


 


Acetaminophen Level < 2.0 UG/ML L 17


 


Urine Barbiturates Screen NEGATIVE 17 0400


 


Urine Phencyclidine Screen NEGATIVE 17


 


Urine Amphetamines Screen NEGATIVE 17


 


Urine Benzodiazepines Screen NEGATIVE 17


 


Urine Cocaine Metabolite Screen NEGATIVE 17


 


Urine Cannabinoids Screen NEGATIVE 17


 


Ethyl Alcohol Level 0.179 % H 17











Home Medications


No Active Prescriptions or Reported Meds





Allergies


Coded Allergies:  


     Penicillins (Verified  Allergy, Intermediate, HIVES, 13)


     Penicillins Cross Reactors (Verified  Allergy, Intermediate, HIVES, 13)


     Quetiapine (Verified  Adverse Reaction, Intermediate, Insomnia and 

restless legs, 17)


     Trazodone (Verified  Adverse Reaction, Intermediate, Insomnia and restless 

legs, 17)











Alva Santiago May 19, 2017 09:37

## 2017-05-20 VITALS — DIASTOLIC BLOOD PRESSURE: 86 MMHG | SYSTOLIC BLOOD PRESSURE: 131 MMHG

## 2017-05-20 VITALS — DIASTOLIC BLOOD PRESSURE: 72 MMHG | SYSTOLIC BLOOD PRESSURE: 134 MMHG

## 2017-05-20 RX ADMIN — Medication SCH: at 21:07

## 2017-05-20 RX ADMIN — VENLAFAXINE HYDROCHLORIDE SCH MG: 75 CAPSULE, EXTENDED RELEASE ORAL at 08:02

## 2017-05-20 RX ADMIN — DIVALPROEX SODIUM SCH MG: 500 TABLET, FILM COATED, EXTENDED RELEASE ORAL at 22:01

## 2017-05-20 NOTE — MHIPNPDOC
[facility] Progress Note


Progress Note


DATE OF SERVICE: 5/20/17





INTERVAL HISTORY:





Medication Side effects: the patient reports no side effects from venlafaxine 

or Depakote


Behavior/events: has been reclusive to his room sleeping the majority of the 

day. It was seen later in the day doing arts and crafts playing a guitar.


Group Attendance: has attended groups


Psychiatric Symptoms: reports that his sleeping problems continue as well as 

his depression and anxiety. He describes it is improving since the restart of 

his venlafaxine and Depakote. Describes that he had stopped taking it as an 

outpatient as he felt "better". He was able to identify that this was the 

treatment being effective and that he perceived incorrectly.





VITAL SIGNS: See below.





NEW TEST RESULTS: See below





CURRENT MEDICATIONS: See below.





MENTAL STATUS EXAMINATION:





General: disheveled


Speech: sparse


Thought processes: coherent


Thought content: reserved


Abstract reasoning, and computation: intact


Description of associations: intact


Description of abnormal or psychotic thoughts:Denies any suicidal or homicidal 

ideation. Denies any auditory or visual hallucinations. Does not appear to be 

responding to internal stimuli. Does not appear to be endorsing any bizarre or 

paranoid ideation.


Judgment: limited


Insight: limited


Orientation: alert and oriented times 3


Recent and remote memory: intact


Attention span and concentration: intact


Fund of knowledge: adequate


Mood: "fine"


Affect: blunted





DIAGNOSES:


1. Unspecified depressive disorder.


2. Poly substance use disorder, severe


3. Unspecified anxiety disorder.


 


ASSESSMENT: improving





MANAGEMENT PLAN: 





Medications: continued venlafaxine and Depakote current dosages. Liver function 

tests are normal





Psychotherapy: encourage group





Social: none





Misc: none





Disposition: The patient will need of further inpatient stay to address 

medication restart severe depressive symptoms. 





TIME SPENT: 15 minutes.





Vital Signs





Vital Signs








  Date Time  Temp Pulse Resp B/P (MAP) Pulse Ox O2 Delivery O2 Flow Rate FiO2


 


5/20/17 18:06 98.3 72 18 134/72 (92)    


 


5/18/17 16:48     96 Room Air  











Current Medications





Current Medications


Acetaminophen (Tylenol Tab) 650 mg Q6HP  PRN PO HEADACHE or DISCOMFORT Last 

administered on 5/19/17at 20:15;  Start 5/18/17 at 18:15;  Stop 6/17/17 at 18:14


Al Hydrox/Mg Hydrox/Simethicone (Mylanta) 30 ml Q4HP  PRN PO HEARTBURN/

INDIGESTION;  Start 5/18/17 at 18:15;  Stop 6/17/17 at 18:14


Divalproex Sodium (Depakote Er) 250 mg QHS PO  Last administered on 5/19/17at 21

:09;  Start 5/19/17 at 21:00;  Stop 5/19/17 at 22:00;  Status DC


Divalproex Sodium (Depakote Er) 500 mg QHS PO  Last administered on 5/20/17at 22

:01;  Start 5/20/17 at 21:00;  Stop 6/19/17 at 20:59


Home Med (Med Rec Complete!)  ASDIRECTED XX ;  Start 5/18/17 at 12:00;  Stop 5/ 18/17 at 12:00;  Status DC


Hydroxyzine HCl (Atarax) 50 mg Q6HP  PRN PO ANXIETY/AGITATION Last administered 

on 5/20/17at 22:01;  Start 5/19/17 at 12:30;  Stop 6/18/17 at 12:29


Ibuprofen (Advil) 600 mg Q8HP  PRN PO PAIN;  Start 5/20/17 at 12:30;  Stop 6/19/ 17 at 12:29


Lidocaine (Lidoderm Patch) 1 patch DAILY  PRN TD BACK PAIN Last administered on 

5/20/17at 16:41;  Start 5/20/17 at 12:30;  Stop 6/19/17 at 12:29


Magnesium Hydroxide (Milk Of Magnesia) 30 ml DAILYPRN  PRN PO CONSTIPATION;  

Start 5/18/17 at 18:15;  Stop 6/17/17 at 18:14


Non-Formulary Medication (** See Comment Field Below **) REMOVE LIDODERM 

PATCH... DAILY@21 XX  Last administered on 5/20/17at 21:07;  Start 5/20/17 at 21

:00;  Stop 6/19/17 at 20:59


Olanzapine (ZyPREXA **ZYDIS**) 5 mg Q4HP  PRN PO ANXIETY;  Start 5/18/17 at 18:

15;  Stop 5/19/17 at 12:32;  Status DC


Venlafaxine HCl (Effexor **Xr**) 75 mg QAM PO  Last administered on 5/20/17at 08

:02;  Start 5/20/17 at 09:00;  Stop 6/19/17 at 08:59





Allergies


Coded Allergies:  


     Penicillins (Verified  Allergy, Intermediate, HIVES, 4/4/13)


     Penicillins Cross Reactors (Verified  Allergy, Intermediate, HIVES, 4/4/13)


     Quetiapine (Verified  Adverse Reaction, Intermediate, Insomnia and 

restless legs, 5/18/17)


     Trazodone (Verified  Adverse Reaction, Intermediate, Insomnia and restless 

legs, 5/18/17)





GME ATTESTATION


My preceptor for this patient encounter was physically present in the building 

during the encounter and was fully available. As needed, all aspects of the 

patient interview, examination, medical decision making process, and medical 

care plan development were reviewed and approved by the preceptor. Preceptor is 

aware and concurs with the plan as stated in the body of this note and will 

attest to such by his/her cosignature.











VALERI PERES DO May 20, 2017 22:32

## 2017-05-21 VITALS — DIASTOLIC BLOOD PRESSURE: 63 MMHG | SYSTOLIC BLOOD PRESSURE: 123 MMHG

## 2017-05-21 VITALS — DIASTOLIC BLOOD PRESSURE: 74 MMHG | SYSTOLIC BLOOD PRESSURE: 142 MMHG

## 2017-05-21 RX ADMIN — Medication SCH: at 20:40

## 2017-05-21 RX ADMIN — VENLAFAXINE HYDROCHLORIDE SCH MG: 75 CAPSULE, EXTENDED RELEASE ORAL at 08:12

## 2017-05-21 RX ADMIN — DIVALPROEX SODIUM SCH MG: 500 TABLET, FILM COATED, EXTENDED RELEASE ORAL at 20:39

## 2017-05-22 VITALS — DIASTOLIC BLOOD PRESSURE: 63 MMHG | SYSTOLIC BLOOD PRESSURE: 118 MMHG

## 2017-05-22 VITALS — SYSTOLIC BLOOD PRESSURE: 136 MMHG | DIASTOLIC BLOOD PRESSURE: 68 MMHG

## 2017-05-22 RX ADMIN — VENLAFAXINE HYDROCHLORIDE SCH MG: 75 CAPSULE, EXTENDED RELEASE ORAL at 08:07

## 2017-05-22 RX ADMIN — Medication SCH: at 20:52

## 2017-05-22 RX ADMIN — DIVALPROEX SODIUM SCH MG: 500 TABLET, FILM COATED, EXTENDED RELEASE ORAL at 20:53

## 2017-05-22 NOTE — MHIPNPDOC
[facility] Progress Note


Progress Note


DATE OF SERVICE: 5/22/17





HISTORY: Patient is a 41-year-old male, who presented to the ER after 

experiencing suicidal ideation with plan to jump in the river. Per ER report, 

patient went to [facility]ita of the [place] after consuming alcohol and 

contemplated ending his life. Patient states he has been admitted for 

psychiatric treatment to [facility] 3 in the past, was last discharged in 

January 2017, then attended inpatient substance abuse treatment 30 days, he 

continued to take his medications for approximately one month after that, then 

stopped medications due to feeling he no longer needed them. 





Writer met with patient today to assess treatment progress on the inpatient 

unit. Patient reports current anxiety level of 4/10, depression 5/10, notes he 

feels "better," denies suicidal and homicidal ideation, denies audiovisual 

hallucinations, denies urge to engage in self-injurious behavior. Patient 

indicates medication restart of venlafaxine and Depakote is helping to reduce 

symptoms of irritability, impulsivity, and mood lability, denies medication 

side effects and denies need for dosing adjustment. Patient states he struggled 

with sleep over the weekend but last night took his PRN hydroxyzine for 

symptoms of anxiety at bedtime, notes medication worked well and also improved 

his sleep, denies nightmares symptoms, and further denies need for alternative 

sleep aid. Patient reports improvement in energy level, concentration and focus

, and appetite. Patient states his girlfriend visited over the weekend noting 

visit was "a big relief," states he is uncertain but thinks he may discharge to 

home with her when prepared. Patient presents with no signs of acute distress 

at time of interaction.





PAST MEDICAL/SURGICAL HISTORY: Cervical DDD and history of left eye foreign 

body. Patient denies history of seizure or head injury.


Labs on admission indicated elevated chloride.


UDS EtOH 0.179


5/19/17 EKG sinus rhythm with sinus arrhythmia possible left atrial enlargement 

rate slower more marked early repolarization changes. Patient is asymptomatic 

and PA is aware.


Repeat LFTs and Depakote level on 5/24/17





MENTAL STATUS EXAMINATION:


General appearance: Patient is a 41-year old male, who is less withdrawn and 

lethargic, appears less disheveled, dressed in hospital clothing, makes fair 

eye contact, ambulates with steady gait, appears stated age.


Speech: Regular rate, rhythm, low volume, coherent, some delay.


Thought processes: Linear, logical, goal-directed.


Thought content: Rational, logical, no paranoia noted at time of assessment.


Abstract reasoning and computation: Appear intact.


Description of associations: Intact.


Description of abnormal or psychotic thoughts: Denies current suicidal or 

homicidal ideation, denies auditory or visual hallucinations, does not appear 

to be responding to internal stimuli, does not endorse bizarre or paranoid 

ideation, and denies preoccupation with violence or obsessions.


Judgment: Limited.


Insight: Limited, some improvement noted.


Orientation: A and O 3.


Recent and remote memory: Appear intact.


Attention span and concentration: Adequate.


Fund of knowledge: Appears adequate.


Mood: "I'm feeling a little better today, I'm hoping my girlfriend let me move 

back." Patient appears less depressed and less anxious, no mood lability noted


Affect: Blunted





DIAGNOSES: Unspecified mood disorder, polysubstance use disorder, rule out MDD, 

rule out bipolar disorder, rule out anxiety disorder, rule out ADHD, rule out 

PTSD





ASSESSMENT: Patient appears to be adjusting to unit, remains isolative to room 

at times, but is at other times visible, engages selectively with peers and 

staff, is participating in most unit programming. 


Patient reports symptoms improvement with restart of Depakote and Effexor, 

denies medication side effects and is declining dosing adjustment. Patient 

denies irritability, agitation, impulsivity, and mood lability, further denies 

symptoms of suicidal or homicidal thinking and verbalizes awareness of how to 

access supportive services on the unit if needed. The importance of medication 

compliance was again reviewed with patient who verbalized understanding. Will 

monitor patient's response to medications and for medication side effects, and 

will evaluate patient's safety, resolution of suicidal thinking, and discharge 

readiness. Patient minimizes alcohol abuse, continues to deny need for 

inpatient or outpatient substance abuse treatment. Patient reiterates today 

that his discharge destination remains unknown due to patient not knowing if he 

can return home with girlfriend and not wanting to return home with mother who 

is reportedly a chronic and excessive alcohol consumer, indicates he may 

present to [facility] is homeless for assistance. HCR case management and TLS 

referrals have been initiated, and patient indicates he wants to go to [facility] for 

outpatient psychotherapy and medication management services. Patient is aware 

that discharge is tentatively planned for mid to late of this week.





MANAGEMENT PLAN: Continue Depakote  mg po q hs, Effexor XR 75 mg po q am, 

and hydroxyzine 50 mg Po q 6 hours PRN anxiety/agitation


Repeat LFTs and Depakote level on 5/24/17


Maintain safety precautions


Patient to attend groups and participate in unit programming to develop coping 

strategies


Engage patient in discharge planning process and arrange meeting with support 

system to ensure safe discharge planning when appropriate


Patient to follow up with PCM upon discharge





TIME SPENT: 35 minutes





Vital Signs





Vital Signs








  Date Time  Temp Pulse Resp B/P (MAP) Pulse Ox O2 Delivery O2 Flow Rate FiO2


 


5/22/17 06:00 97.9 59 16 118/63 (81)    


 


5/18/17 16:48     96 Room Air  











Current Medications





Current Medications


Acetaminophen (Tylenol Tab) 650 mg Q6HP  PRN PO HEADACHE or DISCOMFORT Last 

administered on 5/19/17at 20:15;  Start 5/18/17 at 18:15;  Stop 6/17/17 at 18:14


Al Hydrox/Mg Hydrox/Simethicone (Mylanta) 30 ml Q4HP  PRN PO HEARTBURN/

INDIGESTION;  Start 5/18/17 at 18:15;  Stop 6/17/17 at 18:14


Divalproex Sodium (Depakote Er) 250 mg QHS PO  Last administered on 5/19/17at 21

:09;  Start 5/19/17 at 21:00;  Stop 5/19/17 at 22:00;  Status DC


Divalproex Sodium (Depakote Er) 500 mg QHS PO  Last administered on 5/21/17at 20

:39;  Start 5/20/17 at 21:00;  Stop 6/19/17 at 20:59


Home Med (Med Rec Complete!)  ASDIRECTED XX ;  Start 5/18/17 at 12:00;  Stop 5/ 18/17 at 12:00;  Status DC


Hydroxyzine HCl (Atarax) 50 mg Q6HP  PRN PO ANXIETY/AGITATION Last administered 

on 5/21/17at 20:39;  Start 5/19/17 at 12:30;  Stop 6/18/17 at 12:29


Ibuprofen (Advil) 600 mg Q8HP  PRN PO PAIN;  Start 5/20/17 at 12:30;  Stop 6/19/ 17 at 12:29


Lidocaine (Lidoderm Patch) 1 patch DAILY  PRN TD BACK PAIN Last administered on 

5/20/17at 16:41;  Start 5/20/17 at 12:30;  Stop 6/19/17 at 12:29


Magnesium Hydroxide (Milk Of Magnesia) 30 ml DAILYPRN  PRN PO CONSTIPATION;  

Start 5/18/17 at 18:15;  Stop 6/17/17 at 18:14


Non-Formulary Medication (** See Comment Field Below **) REMOVE LIDODERM 

PATCH... DAILY@21 XX  Last administered on 5/20/17at 21:07;  Start 5/20/17 at 21

:00;  Stop 6/19/17 at 20:59


Olanzapine (ZyPREXA **ZYDIS**) 5 mg Q4HP  PRN PO ANXIETY;  Start 5/18/17 at 18:

15;  Stop 5/19/17 at 12:32;  Status DC


Venlafaxine HCl (Effexor **Xr**) 75 mg QAM PO  Last administered on 5/22/17at 08

:07;  Start 5/20/17 at 09:00;  Stop 6/19/17 at 08:59





Allergies


Coded Allergies:  


     Penicillins (Verified  Allergy, Intermediate, HIVES, 4/4/13)


     Penicillins Cross Reactors (Verified  Allergy, Intermediate, HIVES, 4/4/13)


     Quetiapine (Verified  Adverse Reaction, Intermediate, Insomnia and 

restless legs, 5/18/17)


     Trazodone (Verified  Adverse Reaction, Intermediate, Insomnia and restless 

legs, 5/18/17)











Tali Morales May 22, 2017 08:46

## 2017-05-22 NOTE — IPN
DATE OF VISIT:  05/21/2017

 

MEDICATION SIDE EFFECTS: The patient reports no side effects from Venlafaxine or

Depakote.

 

BEHAVIOR EVENTS: The patient has been isolated in his room, he has not been

socializing or interacting much with staff or peers; although he has attended

groups.

 

PSYCHIATRIC SYMPTOMS: Her reports that his anxiety and post traumatic stress

disorder (PTSD) symptoms have improved although he stills feels depressed but he

denies suicidal ideations. He wakes up during the night with a panic attack but

he cannot remember dreams. He believes that he is doing better now that he has

been taking his venlafaxine and Depakote. He was seen with his wife upon his

request and both of them admitted that he has stopped taking his medications

several months ago and he has not taken them for a long time while he decided to

stop them, that was in proximity about two months ago. Now that he is taking them

he is beginning to feel that he is improving.

 

MENTAL STATUS EXAMINATION: He presented to the interview room dressed in personal

clothes, disheveled, with poor eye contact. He does not speak much, only what is

necessary but he is not incoherent, he is not tangential and he is not

circumstantial. Thought content negative for homicidal ideation, negative for

psychotic thoughts and negative for active suicidal thoughts or passive suicidal

thoughts.

 

DESCRIPTION OF CONSULTATION: Abstract reasoning and computation intact.

 

DESCRIPTION OF ABNORMAL OR PSYCHOTIC THOUGHTS: Not present. Insight and judgment

are poor.

 

ORIENTATION: He is alert and oriented times three. Recent and remote memory is

intact. Attention span and concentration intact. Fund of knowledge full. Mood

"better." Affect constricted.

 

DIAGNOSES:

1. Unspecified depressive disorder.

2. Polysubstance use disorder severe.

3. Unspecified anxiety disorder.

 

ASSESSMENT: Improving.

 

MANAGEMENT PLAN: Continue venlafaxine and Depakote at the current dosages. Liver

function tests are normal but at the end of this week he should be evaluated

again. At the end of this week, he should reconsider the next  blood draw for

liver function tests.

 

PSYCHOTHERAPY : Encourage groups.

 

PLAN: The patient will need to continue to be hospitalized to stabilize him with

medications and with psychotherapy.

 

TIME SPENT: 50 minutes.

## 2017-05-23 VITALS — DIASTOLIC BLOOD PRESSURE: 65 MMHG | SYSTOLIC BLOOD PRESSURE: 137 MMHG

## 2017-05-23 VITALS — DIASTOLIC BLOOD PRESSURE: 58 MMHG | SYSTOLIC BLOOD PRESSURE: 116 MMHG

## 2017-05-23 RX ADMIN — DIVALPROEX SODIUM SCH MG: 500 TABLET, FILM COATED, EXTENDED RELEASE ORAL at 21:20

## 2017-05-23 RX ADMIN — VENLAFAXINE HYDROCHLORIDE SCH MG: 75 CAPSULE, EXTENDED RELEASE ORAL at 08:27

## 2017-05-23 RX ADMIN — Medication SCH: at 21:00

## 2017-05-23 NOTE — MHIPNPDOC
[facility] Progress Note


Progress Note


DATE OF SERVICE: 5/23/17





HISTORY: Patient is a 41-year-old male, who presented to the ER after 

experiencing suicidal ideation with plan to jump in the river. Per ER report, 

patient went to [facility] of the [place] after consuming alcohol and 

contemplated ending his life. Patient states he has been admitted for 

psychiatric treatment to [facility] 3 in the past, was last discharged in 

January 2017, then attended inpatient substance abuse treatment 30 days, he 

continued to take his medications for approximately one month after that, then 

stopped medications due to feeling he no longer needed them. 





Writer met with patient today to assess treatment progress on the inpatient 

unit. Patient reports current anxiety level of 2/10, depression 2/10, notes he 

experienced notable improvement symptoms of anxiety and depression, denies 

suicidal and homicidal ideation, denies audiovisual hallucinations, denies urge 

to engage in self-injurious behavior. Patient indicates medication restart of 

venlafaxine and Depakote is "very helpful" in reducing symptoms of irritability

, impulsivity, and mood lability, denies medication side effects and denies 

need for dosing adjustment. Patient reports improvement to sleep with 

utilization of hydroxyzine hs, denies nightmares symptoms, indicates he no 

longer has need for alternative sleep aid. Patient reports improvement in 

energy level, concentration and focus, and appetite. Patient reiterates today 

he and girlfriend have resolved relationship tension and plan is for him now to 

discharge back home with her. Patient presents with no signs of acute distress 

at time of interaction.





PAST MEDICAL/SURGICAL HISTORY: Cervical DDD and history of left eye foreign 

body. Patient denies history of seizure or head injury.


Labs on admission indicated elevated chloride.


UDS EtOH 0.179


5/19/17 EKG sinus rhythm with sinus arrhythmia possible left atrial enlargement 

rate slower more marked early repolarization changes. Patient is asymptomatic, 

clinical consultation sought, patient to follow-up outpatient.


Repeat LFTs and Depakote level on 5/24/17





MENTAL STATUS EXAMINATION:


General appearance: Patient is a 41-year old male, who is less engageable, 

presents with adequate hygiene, dressed in hospital clothing, makes adequate 

eye contact, ambulates with steady gait, appears stated age.


Speech: Regular rate, rhythm, normal volume, coherent, spontaneous 


Thought processes: Linear, logical, goal-directed.


Thought content: Rational, logical, no paranoia noted at time of assessment.


Abstract reasoning and computation: Appear intact.


Description of associations: Intact.


Description of abnormal or psychotic thoughts: Denies current suicidal or 

homicidal ideation, denies auditory or visual hallucinations, does not appear 

to be responding to internal stimuli, does not endorse bizarre or paranoid 

ideation, and denies preoccupation with violence or obsessions.


Judgment: Adequate, has improved during treatment


Insight: Fair, has improved during treatment 


Orientation: A and O 3.


Recent and remote memory: Appear intact.


Attention span and concentration: Adequate.


Fund of knowledge: Appears adequate.


Mood: "I'm feeling better; I'm excited about going home." Patient appears less 

depressed and less anxious, no mood lability noted


Affect: Constricted, brightens at times, congruent with mood 





DIAGNOSES: Unspecified mood disorder, polysubstance use disorder, rule out MDD, 

rule out bipolar disorder, rule out anxiety disorder, rule out ADHD, rule out 

PTSD





ASSESSMENT: Patient has adjusted to unit, remains quiet but is visible and 

engageable, socializes with select peers, attends groups. Patient reports 

symptoms improvement with restart of Depakote and Effexor, denies medication 

side effects and is declining dosing adjustment. Patient denies irritability, 

agitation, impulsivity, and mood lability, further denies symptoms of suicidal 

or homicidal thinking and verbalizes awareness of how to access supportive 

services on the unit if needed. The importance of medication compliance was 

again reviewed with patient who verbalized understanding. Patient today 

verbalizes some insight into events and behavior which led to current 

hospitalization, however, continues to decline participation in inpatient or 

outpatient substance abuse treatment post discharge noting he has completed 

treatment before. Patient denies he currently has substance abuse problem, 

reiterates he had been clean and sober since completing rehabilitation until 

recent relapse on alcohol which he attributes to being around alcoholic mother. 

Will continue to monitor patient's response to medications and for medication 

side effects, and will evaluate patient's safety, resolution of suicidal 

thinking, and discharge readiness. Patient indicates today he and girlfriend 

have discussed discharge and plan is for patient to discharge to home with 

girlfriend with whom he was living until recently moving in with mother. 

Patient remains interested in receiving HCR case management and TLS referral 

has been completed for outpatient psychotherapy and medication management 

services. Patient is aware the family meeting has been scheduled for tomorrow 

in preparation for tentative discharge to home tomorrow.





MANAGEMENT PLAN: Continue Depakote  mg po q hs, Effexor XR 75 mg po q am, 

and hydroxyzine 50 mg Po q 6 hours PRN anxiety/agitation


Repeat LFTs and Depakote level on 5/24/17


Maintain safety precautions


Patient to attend groups and participate in unit programming to develop coping 

strategies


Engage patient in discharge planning process and arrange meeting with support 

system to ensure safe discharge planning when appropriate


Patient to follow up with PCM upon discharge





TIME SPENT: 35 minutes





Vital Signs





Vital Signs








  Date Time  Temp Pulse Resp B/P (MAP) Pulse Ox O2 Delivery O2 Flow Rate FiO2


 


5/23/17 06:44 97.9 56 16 116/58 (77)    


 


5/18/17 16:48     96 Room Air  











Current Medications





Current Medications


Acetaminophen (Tylenol Tab) 650 mg Q6HP  PRN PO HEADACHE or DISCOMFORT Last 

administered on 5/19/17at 20:15;  Start 5/18/17 at 18:15;  Stop 6/17/17 at 18:14


Al Hydrox/Mg Hydrox/Simethicone (Mylanta) 30 ml Q4HP  PRN PO HEARTBURN/

INDIGESTION;  Start 5/18/17 at 18:15;  Stop 6/17/17 at 18:14


Divalproex Sodium (Depakote Er) 250 mg QHS PO  Last administered on 5/19/17at 21

:09;  Start 5/19/17 at 21:00;  Stop 5/19/17 at 22:00;  Status DC


Divalproex Sodium (Depakote Er) 500 mg QHS PO  Last administered on 5/22/17at 20

:53;  Start 5/20/17 at 21:00;  Stop 6/19/17 at 20:59


Home Med (Med Rec Complete!)  ASDIRECTED XX ;  Start 5/18/17 at 12:00;  Stop 5/ 18/17 at 12:00;  Status DC


Hydroxyzine HCl (Atarax) 50 mg Q6HP  PRN PO ANXIETY/AGITATION Last administered 

on 5/22/17at 20:53;  Start 5/19/17 at 12:30;  Stop 6/18/17 at 12:29


Ibuprofen (Advil) 600 mg Q8HP  PRN PO PAIN;  Start 5/20/17 at 12:30;  Stop 6/19/ 17 at 12:29


Lidocaine (Lidoderm Patch) 1 patch DAILY  PRN TD BACK PAIN Last administered on 

5/20/17at 16:41;  Start 5/20/17 at 12:30;  Stop 6/19/17 at 12:29


Magnesium Hydroxide (Milk Of Magnesia) 30 ml DAILYPRN  PRN PO CONSTIPATION;  

Start 5/18/17 at 18:15;  Stop 6/17/17 at 18:14


Non-Formulary Medication (** See Comment Field Below **) REMOVE LIDODERM 

PATCH... DAILY@21 XX  Last administered on 5/20/17at 21:07;  Start 5/20/17 at 21

:00;  Stop 6/19/17 at 20:59


Olanzapine (ZyPREXA **ZYDIS**) 5 mg Q4HP  PRN PO ANXIETY;  Start 5/18/17 at 18:

15;  Stop 5/19/17 at 12:32;  Status DC


Venlafaxine HCl (Effexor **Xr**) 75 mg QAM PO  Last administered on 5/23/17at 08

:27;  Start 5/20/17 at 09:00;  Stop 6/19/17 at 08:59





Allergies


Coded Allergies:  


     Penicillins (Verified  Allergy, Intermediate, HIVES, 4/4/13)


     Penicillins Cross Reactors (Verified  Allergy, Intermediate, HIVES, 4/4/13)


     Quetiapine (Verified  Adverse Reaction, Intermediate, Insomnia and 

restless legs, 5/18/17)


     Trazodone (Verified  Adverse Reaction, Intermediate, Insomnia and restless 

legs, 5/18/17)











Tali Morales May 23, 2017 09:18

## 2017-05-24 VITALS — SYSTOLIC BLOOD PRESSURE: 132 MMHG | DIASTOLIC BLOOD PRESSURE: 58 MMHG

## 2017-05-24 LAB
ALBUMIN SERPL BCG-MCNC: 3.9 GM/DL (ref 3.2–5.2)
ALBUMIN/GLOB SERPL: 1.3 {RATIO} (ref 1–1.93)
ALP SERPL-CCNC: 50 U/L (ref 45–117)
ALT SERPL W P-5'-P-CCNC: 31 U/L (ref 12–78)
AST SERPL-CCNC: 15 U/L (ref 15–37)
BILIRUB CONJ SERPL-MCNC: 0.1 MG/DL (ref 0–0.2)
BILIRUB SERPL-MCNC: 0.5 MG/DL (ref 0.2–1)
PROT SERPL-MCNC: 6.9 GM/DL (ref 6.4–8.2)

## 2017-05-24 RX ADMIN — VENLAFAXINE HYDROCHLORIDE SCH MG: 75 CAPSULE, EXTENDED RELEASE ORAL at 08:27
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
## 2017-06-09 ENCOUNTER — HOSPITAL ENCOUNTER (EMERGENCY)
Dept: HOSPITAL 53 - M ED | Age: 42
Discharge: HOME | End: 2017-06-09
Payer: COMMERCIAL

## 2017-06-09 VITALS — SYSTOLIC BLOOD PRESSURE: 137 MMHG | DIASTOLIC BLOOD PRESSURE: 89 MMHG

## 2017-06-09 VITALS — WEIGHT: 200 LBS | BODY MASS INDEX: 25.67 KG/M2 | HEIGHT: 74 IN
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
## 2017-06-09 DIAGNOSIS — F41.9: ICD-10-CM

## 2017-06-09 DIAGNOSIS — Z88.8: ICD-10-CM

## 2017-06-09 DIAGNOSIS — G89.29: ICD-10-CM

## 2017-06-09 DIAGNOSIS — D68.59: ICD-10-CM

## 2017-06-09 DIAGNOSIS — F33.9: ICD-10-CM

## 2017-06-09 DIAGNOSIS — Z87.891: ICD-10-CM

## 2017-06-09 DIAGNOSIS — Z88.0: ICD-10-CM

## 2017-06-09 DIAGNOSIS — M54.9: ICD-10-CM
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
## 2017-06-09 DIAGNOSIS — S20.211A: Primary | ICD-10-CM

## 2017-06-09 DIAGNOSIS — Z79.899: ICD-10-CM
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
## 2017-06-09 NOTE — REP
Right ribs and PA chest:

 

Comparison is the PA and lateral chest dated 09/23/2012.

 

Right ribs four views:

 

There is no rib fracture or other rib abnormality.

 

 

 

 

 

PA chest:

 

There is no pneumothorax, hemothorax or pulmonary contusion.

 

Lung fields are clear.  Cardiac size is normal.  The kai, mediastinum, bony

thorax unremarkable.

 

No change from the comparison study.

 

 

Signed by

Luis Angel Dickinson MD 06/09/2017 05:48 P

## 2018-03-06 ENCOUNTER — HOSPITAL ENCOUNTER (INPATIENT)
Dept: HOSPITAL 53 - M ED | Age: 43
LOS: 6 days | Discharge: HOME | DRG: 755 | End: 2018-03-12
Attending: PSYCHIATRY & NEUROLOGY | Admitting: PSYCHIATRY & NEUROLOGY
Payer: SELF-PAY

## 2018-03-06 DIAGNOSIS — R10.9: ICD-10-CM

## 2018-03-06 DIAGNOSIS — Z88.0: ICD-10-CM

## 2018-03-06 DIAGNOSIS — Z88.8: ICD-10-CM

## 2018-03-06 DIAGNOSIS — F12.20: ICD-10-CM

## 2018-03-06 DIAGNOSIS — F19.94: ICD-10-CM

## 2018-03-06 DIAGNOSIS — F43.23: Primary | ICD-10-CM

## 2018-03-06 LAB
ACETAMINOPHEN LEVEL: < 2 UG/ML (ref 10–30)
ALBUMIN/GLOBULIN RATIO: 1.39 (ref 1–1.93)
ALBUMIN: 4.6 GM/DL (ref 3.2–5.2)
ALKALINE PHOSPHATASE: 50 U/L (ref 45–117)
ALT SERPL W P-5'-P-CCNC: 26 U/L (ref 12–78)
AMPHETAMINES UR QL SCN: NEGATIVE
ANION GAP: 9 MEQ/L (ref 8–16)
AST SERPL-CCNC: 20 U/L (ref 7–37)
BARBITURATES UR QL SCN: NEGATIVE
BENZODIAZ UR QL SCN: NEGATIVE
BILIRUB CONJ SERPL-MCNC: 0.1 MG/DL (ref 0–0.2)
BILIRUBIN,TOTAL: 0.6 MG/DL (ref 0.2–1)
BLOOD UREA NITROGEN: 23 MG/DL (ref 7–18)
BZE UR QL SCN: NEGATIVE
CALCIUM LEVEL: 9.7 MG/DL (ref 8.5–10.1)
CANNABINOIDS UR QL SCN: POSITIVE
CARBON DIOXIDE LEVEL: 26 MEQ/L (ref 21–32)
CHLORIDE LEVEL: 108 MEQ/L (ref 98–107)
CREATININE FOR GFR: 1.16 MG/DL (ref 0.7–1.3)
ETHYL ALCOHOL (ETHANOL): < 0.003 % (ref 0–0.01)
GFR SERPL CREATININE-BSD FRML MDRD: > 60 ML/MIN/{1.73_M2} (ref 60–?)
GLUCOSE, FASTING: 88 MG/DL (ref 70–100)
HEMATOCRIT: 50.1 % (ref 42–52)
HEMOGLOBIN: 16.9 G/DL (ref 14–18)
MEAN CORPUSCULAR HEMOGLOBIN: 29.5 PG (ref 27–33)
MEAN CORPUSCULAR HGB CONC: 33.7 G/DL (ref 32–36.5)
MEAN CORPUSCULAR VOLUME: 87.4 FL (ref 80–96)
METHADONE URINE: NEGATIVE
NRBC BLD AUTO-RTO: 0 % (ref 0–0)
OPIATES UR QL SCN: NEGATIVE
PCP UR QL SCN: NEGATIVE
PLATELET COUNT, AUTOMATED: 373 10^3/UL (ref 150–450)
POTASSIUM SERUM: 4.2 MEQ/L (ref 3.5–5.1)
RED BLOOD COUNT: 5.73 10^6/UL (ref 4.3–6.1)
RED CELL DISTRIBUTION WIDTH: 13 % (ref 11.5–14.5)
SALICYLATE LEVEL: < 1.7 MG/DL (ref 5–30)
SODIUM LEVEL: 143 MEQ/L (ref 136–145)
THYROID STIMULATING HORMONE: 1.26 UIU/ML (ref 0.36–3.74)
TOTAL PROTEIN: 7.9 GM/DL (ref 6.4–8.2)
WHITE BLOOD COUNT: 11.8 10^3/UL (ref 4–10)

## 2018-03-07 LAB
ALBUMIN/GLOBULIN RATIO: 1.39 (ref 1–1.93)
ALBUMIN: 4.6 GM/DL (ref 3.2–5.2)
ALKALINE PHOSPHATASE: 53 U/L (ref 45–117)
ALT SERPL W P-5'-P-CCNC: 25 U/L (ref 12–78)
ANION GAP: 8 MEQ/L (ref 8–16)
AST SERPL-CCNC: 21 U/L (ref 7–37)
BASO #: 0.1 10^3/UL (ref 0–0.2)
BASO %: 0.6 % (ref 0–1)
BILIRUBIN,TOTAL: 0.8 MG/DL (ref 0.2–1)
BLOOD UREA NITROGEN: 22 MG/DL (ref 7–18)
CALCIUM LEVEL: 9.5 MG/DL (ref 8.5–10.1)
CARBON DIOXIDE LEVEL: 29 MEQ/L (ref 21–32)
CHLORIDE LEVEL: 102 MEQ/L (ref 98–107)
CREATININE FOR GFR: 1.14 MG/DL (ref 0.7–1.3)
EOS #: 0.4 10^3/UL (ref 0–0.5)
EOSINOPHIL NFR BLD AUTO: 4.5 % (ref 0–3)
GFR SERPL CREATININE-BSD FRML MDRD: > 60 ML/MIN/{1.73_M2} (ref 60–?)
GLUCOSE, FASTING: 79 MG/DL (ref 70–100)
HEMATOCRIT: 50.3 % (ref 42–52)
HEMOGLOBIN: 17.1 G/DL (ref 14–18)
IMMATURE GRANULOCYTE %: 0.5 % (ref 0–3)
LEUKOCYTE ESTERASE UR AUTO RFX: NEGATIVE
LYMPH #: 2.8 10^3/UL (ref 1.5–4.5)
LYMPH %: 33 % (ref 24–44)
MEAN CORPUSCULAR HEMOGLOBIN: 29.7 PG (ref 27–33)
MEAN CORPUSCULAR HGB CONC: 34 G/DL (ref 32–36.5)
MEAN CORPUSCULAR VOLUME: 87.5 FL (ref 80–96)
MONO #: 0.7 10^3/UL (ref 0–0.8)
MONO %: 7.7 % (ref 0–5)
NEUTROPHILS #: 4.6 10^3/UL (ref 1.8–7.7)
NEUTROPHILS %: 53.7 % (ref 36–66)
NRBC BLD AUTO-RTO: 0 % (ref 0–0)
PLATELET COUNT, AUTOMATED: 371 10^3/UL (ref 150–450)
POTASSIUM SERUM: 4.5 MEQ/L (ref 3.5–5.1)
RED BLOOD COUNT: 5.75 10^6/UL (ref 4.3–6.1)
RED CELL DISTRIBUTION WIDTH: 12.8 % (ref 11.5–14.5)
SODIUM LEVEL: 139 MEQ/L (ref 136–145)
SPECIFIC GRAVITY UR AUTO RFX: 1.01 (ref 1–1.03)
SQUAM EPITHELIAL CELL UR AURFX: 0 /HPF (ref 0–6)
TOTAL PROTEIN: 7.9 GM/DL (ref 6.4–8.2)
WHITE BLOOD COUNT: 8.6 10^3/UL (ref 4–10)

## 2018-03-07 RX ADMIN — INFLUENZA A VIRUS A/VICTORIA/2570/2019 IVR-215 (H1N1) ANTIGEN (FORMALDEHYDE INACTIVATED), INFLUENZA A VIRUS A/DARWIN/9/2021 SAN-010 (H3N2) ANTIGEN (FORMALDEHYDE INACTIVATED), INFLUENZA B VIRUS B/PHUKET/3073/2013 ANTIGEN (FORMALDEHYDE INACTIVATED), AND INFLUENZA B VIRUS B/MICHIGAN/01/2021 ANTIGEN (FORMALDEHYDE INACTIVATED) 1 ML: 15; 15; 15; 15 INJECTION, SUSPENSION INTRAMUSCULAR at 10:24

## 2018-03-07 RX ADMIN — ACETAMINOPHEN 1 MG: 325 TABLET ORAL at 18:50

## 2018-03-07 RX ADMIN — PAROXETINE HYDROCHLORIDE 1 MG: 10 TABLET, FILM COATED ORAL at 10:24

## 2018-03-08 RX ADMIN — ESCITSLOPRAM 1 MG: 5 TABLET ORAL at 14:43

## 2018-03-08 RX ADMIN — PAROXETINE HYDROCHLORIDE 1 MG: 10 TABLET, FILM COATED ORAL at 08:45

## 2018-03-09 RX ADMIN — ESCITALOPRAM OXALATE 1 MG: 10 TABLET, FILM COATED ORAL at 09:00

## 2018-03-09 RX ADMIN — ESCITSLOPRAM 1 MG: 5 TABLET ORAL at 08:14

## 2018-03-10 RX ADMIN — ESCITALOPRAM OXALATE 1 MG: 10 TABLET, FILM COATED ORAL at 08:38

## 2018-03-11 RX ADMIN — ESCITALOPRAM OXALATE 1 MG: 10 TABLET, FILM COATED ORAL at 09:02

## 2018-03-12 RX ADMIN — ESCITALOPRAM OXALATE 1 MG: 10 TABLET, FILM COATED ORAL at 08:26

## 2018-04-27 ENCOUNTER — HOSPITAL ENCOUNTER (EMERGENCY)
Dept: HOSPITAL 53 - M ED | Age: 43
Discharge: HOME | End: 2018-04-27
Payer: COMMERCIAL
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
## 2018-04-27 DIAGNOSIS — D68.51: ICD-10-CM

## 2018-04-27 DIAGNOSIS — S66.911A: Primary | ICD-10-CM

## 2018-04-27 DIAGNOSIS — Z88.8: ICD-10-CM

## 2018-04-27 DIAGNOSIS — Z79.899: ICD-10-CM
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
## 2018-04-27 DIAGNOSIS — Z87.891: ICD-10-CM

## 2018-04-27 DIAGNOSIS — Z88.0: ICD-10-CM

## 2018-04-27 LAB
ANION GAP: 7 MEQ/L (ref 8–16)
BASO #: 0.1 10^3/UL (ref 0–0.2)
BASO %: 0.5 % (ref 0–1)
BLOOD UREA NITROGEN: 17 MG/DL (ref 7–18)
CALCIUM LEVEL: 9 MG/DL (ref 8.5–10.1)
CARBON DIOXIDE LEVEL: 25 MEQ/L (ref 21–32)
CHLORIDE LEVEL: 111 MEQ/L (ref 98–107)
CREATININE FOR GFR: 0.95 MG/DL (ref 0.7–1.3)
EOS #: 0.5 10^3/UL (ref 0–0.5)
EOSINOPHIL NFR BLD AUTO: 4 % (ref 0–3)
GFR SERPL CREATININE-BSD FRML MDRD: > 60 ML/MIN/{1.73_M2} (ref 60–?)
GLUCOSE, FASTING: 92 MG/DL (ref 70–100)
HEMATOCRIT: 44.2 % (ref 42–52)
HEMOGLOBIN: 14.8 G/DL (ref 13.5–17.5)
IMMATURE GRANULOCYTE %: 0.5 % (ref 0–3)
LYMPH #: 2.6 10^3/UL (ref 1.5–4.5)
LYMPH %: 21 % (ref 24–44)
MEAN CORPUSCULAR HEMOGLOBIN: 29.7 PG (ref 27–33)
MEAN CORPUSCULAR HGB CONC: 33.5 G/DL (ref 32–36.5)
MEAN CORPUSCULAR VOLUME: 88.8 FL (ref 80–96)
MONO #: 0.9 10^3/UL (ref 0–0.8)
MONO %: 7.1 % (ref 0–5)
NEUTROPHILS #: 8.4 10^3/UL (ref 1.8–7.7)
NEUTROPHILS %: 66.9 % (ref 36–66)
NRBC BLD AUTO-RTO: 0 % (ref 0–0)
PLATELET COUNT, AUTOMATED: 367 10^3/UL (ref 150–450)
POTASSIUM SERUM: 4.4 MEQ/L (ref 3.5–5.1)
RED BLOOD COUNT: 4.98 10^6/UL (ref 4.3–6.1)
RED CELL DISTRIBUTION WIDTH: 13.3 % (ref 11.5–14.5)
SODIUM LEVEL: 143 MEQ/L (ref 136–145)
URIC ACID: 4.6 MG/DL (ref 3.5–7.2)
WHITE BLOOD COUNT: 12.5 10^3/UL (ref 4–10)

## 2018-04-27 PROCEDURE — 84550 ASSAY OF BLOOD/URIC ACID: CPT

## 2018-04-27 RX ADMIN — NAPROXEN 1 MG: 250 TABLET ORAL at 13:04

## 2018-06-07 ENCOUNTER — HOSPITAL ENCOUNTER (EMERGENCY)
Dept: HOSPITAL 53 - M ED | Age: 43
Discharge: HOME | End: 2018-06-07
Payer: COMMERCIAL

## 2018-06-07 DIAGNOSIS — H65.03: Primary | ICD-10-CM

## 2018-06-07 DIAGNOSIS — Z88.0: ICD-10-CM

## 2018-06-07 DIAGNOSIS — J30.9: ICD-10-CM

## 2018-06-07 DIAGNOSIS — F17.210: ICD-10-CM

## 2018-06-07 DIAGNOSIS — Z88.8: ICD-10-CM

## 2018-06-07 PROCEDURE — 87880 STREP A ASSAY W/OPTIC: CPT

## 2018-08-16 ENCOUNTER — HOSPITAL ENCOUNTER (INPATIENT)
Dept: HOSPITAL 53 - M PSY | Age: 43
LOS: 6 days | Discharge: HOME | DRG: 755 | End: 2018-08-22
Attending: PSYCHIATRY & NEUROLOGY | Admitting: PSYCHIATRY & NEUROLOGY
Payer: COMMERCIAL

## 2018-08-16 DIAGNOSIS — R45.851: ICD-10-CM

## 2018-08-16 DIAGNOSIS — Z88.8: ICD-10-CM

## 2018-08-16 DIAGNOSIS — Z79.899: ICD-10-CM

## 2018-08-16 DIAGNOSIS — F23: ICD-10-CM

## 2018-08-16 DIAGNOSIS — Z88.0: ICD-10-CM

## 2018-08-16 DIAGNOSIS — F43.22: Primary | ICD-10-CM

## 2018-08-16 DIAGNOSIS — R45.850: ICD-10-CM

## 2018-08-16 DIAGNOSIS — F17.210: ICD-10-CM

## 2018-08-16 DIAGNOSIS — M50.30: ICD-10-CM

## 2018-08-16 RX ADMIN — DIPHENHYDRAMINE HYDROCHLORIDE 1 MG: 50 INJECTION, SOLUTION INTRAMUSCULAR; INTRAVENOUS at 23:43

## 2018-08-16 RX ADMIN — LORAZEPAM 1 MG: 2 INJECTION INTRAMUSCULAR; INTRAVENOUS at 23:30

## 2018-08-16 RX ADMIN — HALOPERIDOL LACTATE 1 MG: 5 INJECTION INTRAMUSCULAR at 23:43

## 2018-08-17 LAB
ACETAMINOPHEN LEVEL: < 2 UG/ML (ref 10–30)
ALBUMIN/GLOBULIN RATIO: 1.18 (ref 1–1.93)
ALBUMIN: 3.9 GM/DL (ref 3.2–5.2)
ALKALINE PHOSPHATASE: 55 U/L (ref 45–117)
ALT SERPL W P-5'-P-CCNC: 24 U/L (ref 12–78)
AMPHETAMINES UR QL SCN: NEGATIVE
ANION GAP: 11 MEQ/L (ref 8–16)
AST SERPL-CCNC: 16 U/L (ref 7–37)
BARBITURATES UR QL SCN: NEGATIVE
BENZODIAZ UR QL SCN: NEGATIVE
BILIRUB CONJ SERPL-MCNC: < 0.1 MG/DL (ref 0–0.2)
BILIRUBIN,TOTAL: 0.3 MG/DL (ref 0.2–1)
BLOOD UREA NITROGEN: 14 MG/DL (ref 7–18)
BZE UR QL SCN: NEGATIVE
CALCIUM LEVEL: 8.6 MG/DL (ref 8.5–10.1)
CANNABINOIDS UR QL SCN: POSITIVE
CARBON DIOXIDE LEVEL: 23 MEQ/L (ref 21–32)
CHLORIDE LEVEL: 107 MEQ/L (ref 98–107)
CREATININE FOR GFR: 0.95 MG/DL (ref 0.7–1.3)
ETHYL ALCOHOL (ETHANOL): 0.02 % (ref 0–0.01)
GFR SERPL CREATININE-BSD FRML MDRD: > 60 ML/MIN/{1.73_M2} (ref 60–?)
GLUCOSE, FASTING: 88 MG/DL (ref 70–100)
HEMATOCRIT: 50.4 % (ref 42–52)
HEMOGLOBIN: 17 G/DL (ref 13.5–17.5)
MEAN CORPUSCULAR HEMOGLOBIN: 30 PG (ref 27–33)
MEAN CORPUSCULAR HGB CONC: 33.7 G/DL (ref 32–36.5)
MEAN CORPUSCULAR VOLUME: 88.9 FL (ref 80–96)
METHADONE URINE: NEGATIVE
NRBC BLD AUTO-RTO: 0 % (ref 0–0)
OPIATES UR QL SCN: NEGATIVE
PCP UR QL SCN: NEGATIVE
PLATELET COUNT, AUTOMATED: 356 10^3/UL (ref 150–450)
POTASSIUM SERUM: 3.8 MEQ/L (ref 3.5–5.1)
RED BLOOD COUNT: 5.67 10^6/UL (ref 4.3–6.1)
RED CELL DISTRIBUTION WIDTH: 13.6 % (ref 11.5–14.5)
SALICYLATE LEVEL: 4.3 MG/DL (ref 5–30)
SODIUM LEVEL: 141 MEQ/L (ref 136–145)
THYROID STIMULATING HORMONE: 3.31 UIU/ML (ref 0.36–3.74)
TOTAL PROTEIN: 7.2 GM/DL (ref 6.4–8.2)
WHITE BLOOD COUNT: 8.6 10^3/UL (ref 4–10)

## 2018-08-17 RX ADMIN — ESCITALOPRAM OXALATE 1 MG: 10 TABLET, FILM COATED ORAL at 21:00

## 2018-08-17 RX ADMIN — PROPRANOLOL HYDROCHLORIDE 1 MG: 10 TABLET ORAL at 15:05

## 2018-08-17 RX ADMIN — NICOTINE 1 PATCH: 21 PATCH, EXTENDED RELEASE TRANSDERMAL at 09:00

## 2018-08-17 RX ADMIN — PROPRANOLOL HYDROCHLORIDE 1 MG: 10 TABLET ORAL at 21:00

## 2018-08-18 RX ADMIN — PROPRANOLOL HYDROCHLORIDE 1 MG: 10 TABLET ORAL at 15:41

## 2018-08-18 RX ADMIN — DOXEPIN HYDROCHLORIDE 1 MG: 25 CAPSULE ORAL at 21:11

## 2018-08-18 RX ADMIN — PROPRANOLOL HYDROCHLORIDE 1 MG: 10 TABLET ORAL at 20:34

## 2018-08-18 RX ADMIN — PROPRANOLOL HYDROCHLORIDE 1 MG: 10 TABLET ORAL at 09:00

## 2018-08-18 RX ADMIN — PROPRANOLOL HYDROCHLORIDE 1 MG: 10 TABLET ORAL at 11:15

## 2018-08-18 RX ADMIN — ESCITALOPRAM OXALATE 1 MG: 10 TABLET, FILM COATED ORAL at 20:33

## 2018-08-18 RX ADMIN — NICOTINE 1 PATCH: 21 PATCH, EXTENDED RELEASE TRANSDERMAL at 09:00

## 2018-08-19 LAB — GLUCOSE BLDC GLUCOMTR-MCNC: 115 MG/DL (ref 70–105)

## 2018-08-19 RX ADMIN — DOXEPIN HYDROCHLORIDE 1 MG: 25 CAPSULE ORAL at 21:42

## 2018-08-19 RX ADMIN — ACETAMINOPHEN 1 MG: 325 TABLET ORAL at 17:46

## 2018-08-19 RX ADMIN — PROPRANOLOL HYDROCHLORIDE 1 MG: 10 TABLET ORAL at 15:28

## 2018-08-19 RX ADMIN — ESCITALOPRAM OXALATE 1 MG: 10 TABLET, FILM COATED ORAL at 20:29

## 2018-08-19 RX ADMIN — PROPRANOLOL HYDROCHLORIDE 1 MG: 10 TABLET ORAL at 20:29

## 2018-08-19 RX ADMIN — NICOTINE 1 PATCH: 21 PATCH, EXTENDED RELEASE TRANSDERMAL at 08:45

## 2018-08-19 RX ADMIN — PROPRANOLOL HYDROCHLORIDE 1 MG: 10 TABLET ORAL at 08:45

## 2018-08-20 RX ADMIN — PROPRANOLOL HYDROCHLORIDE 1 MG: 10 TABLET ORAL at 15:21

## 2018-08-20 RX ADMIN — HALOPERIDOL 1 MG: 5 TABLET ORAL at 11:55

## 2018-08-20 RX ADMIN — ESCITALOPRAM OXALATE 1 MG: 10 TABLET, FILM COATED ORAL at 20:49

## 2018-08-20 RX ADMIN — HALOPERIDOL 1 MG: 5 TABLET ORAL at 20:49

## 2018-08-20 RX ADMIN — NICOTINE 1 PATCH: 21 PATCH, EXTENDED RELEASE TRANSDERMAL at 08:17

## 2018-08-20 RX ADMIN — PROPRANOLOL HYDROCHLORIDE 1 MG: 10 TABLET ORAL at 20:49

## 2018-08-20 RX ADMIN — PROPRANOLOL HYDROCHLORIDE 1 MG: 10 TABLET ORAL at 08:17

## 2018-08-21 RX ADMIN — PROPRANOLOL HYDROCHLORIDE 1 MG: 10 TABLET ORAL at 09:04

## 2018-08-21 RX ADMIN — PROPRANOLOL HYDROCHLORIDE 1 MG: 10 TABLET ORAL at 16:25

## 2018-08-21 RX ADMIN — NICOTINE 1 PATCH: 21 PATCH, EXTENDED RELEASE TRANSDERMAL at 09:00

## 2018-08-21 RX ADMIN — ESCITALOPRAM OXALATE 1 MG: 10 TABLET, FILM COATED ORAL at 21:14

## 2018-08-21 RX ADMIN — PROPRANOLOL HYDROCHLORIDE 1 MG: 10 TABLET ORAL at 21:00

## 2018-08-21 RX ADMIN — HALOPERIDOL 1 MG: 5 TABLET ORAL at 09:03

## 2018-08-21 RX ADMIN — HALOPERIDOL 1 MG: 5 TABLET ORAL at 21:14

## 2018-08-22 RX ADMIN — NICOTINE 1 PATCH: 21 PATCH, EXTENDED RELEASE TRANSDERMAL at 08:04

## 2018-08-22 RX ADMIN — HALOPERIDOL 1 MG: 5 TABLET ORAL at 08:04

## 2018-08-22 RX ADMIN — PROPRANOLOL HYDROCHLORIDE 1 MG: 10 TABLET ORAL at 08:06

## 2018-10-02 ENCOUNTER — HOSPITAL ENCOUNTER (EMERGENCY)
Dept: HOSPITAL 53 - M ED | Age: 43
Discharge: HOME | End: 2018-10-02
Payer: COMMERCIAL

## 2018-10-02 DIAGNOSIS — S60.221A: Primary | ICD-10-CM
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
## 2018-10-02 DIAGNOSIS — Z88.0: ICD-10-CM

## 2018-10-02 DIAGNOSIS — F33.9: ICD-10-CM

## 2018-10-02 DIAGNOSIS — F17.200: ICD-10-CM
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
## 2018-10-02 DIAGNOSIS — F41.9: ICD-10-CM

## 2018-10-02 DIAGNOSIS — D68.51: ICD-10-CM

## 2018-10-02 DIAGNOSIS — Z79.899: ICD-10-CM

## 2018-10-02 DIAGNOSIS — Z88.8: ICD-10-CM

## 2018-10-02 PROCEDURE — 73130 X-RAY EXAM OF HAND: CPT

## 2018-10-02 RX ADMIN — KETOROLAC TROMETHAMINE 1 MG: 10 TABLET, FILM COATED ORAL at 19:46

## 2019-01-17 ENCOUNTER — HOSPITAL ENCOUNTER (EMERGENCY)
Dept: HOSPITAL 53 - M ED | Age: 44
Discharge: HOME | End: 2019-01-17
Payer: COMMERCIAL

## 2019-01-17 VITALS — HEIGHT: 74 IN | BODY MASS INDEX: 23.79 KG/M2 | WEIGHT: 185.39 LBS

## 2019-01-17 VITALS — DIASTOLIC BLOOD PRESSURE: 70 MMHG | SYSTOLIC BLOOD PRESSURE: 118 MMHG
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
## 2019-01-17 DIAGNOSIS — D68.2: ICD-10-CM

## 2019-01-17 DIAGNOSIS — F41.9: ICD-10-CM

## 2019-01-17 DIAGNOSIS — K04.7: Primary | ICD-10-CM

## 2019-01-17 DIAGNOSIS — S02.5XXA: ICD-10-CM
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
## 2019-01-17 DIAGNOSIS — F32.9: ICD-10-CM
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
## 2019-01-17 DIAGNOSIS — Z88.8: ICD-10-CM

## 2019-01-17 DIAGNOSIS — Z88.0: ICD-10-CM

## 2019-01-17 DIAGNOSIS — Z72.0: ICD-10-CM

## 2019-03-06 ENCOUNTER — HOSPITAL ENCOUNTER (INPATIENT)
Dept: HOSPITAL 53 - M ED | Age: 44
LOS: 6 days | Discharge: HOME | End: 2019-03-12
Attending: PSYCHIATRY & NEUROLOGY | Admitting: PSYCHIATRY & NEUROLOGY
Payer: COMMERCIAL

## 2019-03-06 VITALS — BODY MASS INDEX: 23.03 KG/M2 | WEIGHT: 179.46 LBS | HEIGHT: 74 IN

## 2019-03-06 VITALS — DIASTOLIC BLOOD PRESSURE: 84 MMHG | SYSTOLIC BLOOD PRESSURE: 135 MMHG

## 2019-03-06 DIAGNOSIS — M50.30: ICD-10-CM

## 2019-03-06 DIAGNOSIS — F19.94: Primary | ICD-10-CM

## 2019-03-06 DIAGNOSIS — F12.90: ICD-10-CM

## 2019-03-06 DIAGNOSIS — D68.2: ICD-10-CM

## 2019-03-06 DIAGNOSIS — F41.0: ICD-10-CM

## 2019-03-06 DIAGNOSIS — Z88.0: ICD-10-CM

## 2019-03-06 DIAGNOSIS — F14.90: ICD-10-CM

## 2019-03-06 DIAGNOSIS — F43.10: ICD-10-CM

## 2019-03-06 DIAGNOSIS — F17.200: ICD-10-CM

## 2019-03-06 DIAGNOSIS — Z88.8: ICD-10-CM

## 2019-03-06 LAB
ALBUMIN SERPL BCG-MCNC: 3.8 GM/DL (ref 3.2–5.2)
ALT SERPL W P-5'-P-CCNC: 19 U/L (ref 12–78)
AMPHETAMINES UR QL SCN: NEGATIVE
APAP SERPL-MCNC: < 2 UG/ML (ref 10–30)
BARBITURATES UR QL SCN: NEGATIVE
BENZODIAZ UR QL SCN: NEGATIVE
BILIRUB CONJ SERPL-MCNC: 0.1 MG/DL (ref 0–0.2)
BILIRUB SERPL-MCNC: 0.2 MG/DL (ref 0.2–1)
BUN SERPL-MCNC: 13 MG/DL (ref 7–18)
BZE UR QL SCN: POSITIVE
CALCIUM SERPL-MCNC: 8.4 MG/DL (ref 8.5–10.1)
CANNABINOIDS UR QL SCN: POSITIVE
CHLORIDE SERPL-SCNC: 111 MEQ/L (ref 98–107)
CO2 SERPL-SCNC: 29 MEQ/L (ref 21–32)
CREAT SERPL-MCNC: 1.14 MG/DL (ref 0.7–1.3)
ETHANOL SERPL-MCNC: < 0.003 % (ref 0–0.01)
GFR SERPL CREATININE-BSD FRML MDRD: > 60 ML/MIN/{1.73_M2} (ref 60–?)
GLUCOSE SERPL-MCNC: 88 MG/DL (ref 70–100)
HCT VFR BLD AUTO: 49 % (ref 42–52)
HGB BLD-MCNC: 16.1 G/DL (ref 13.5–17.5)
MCH RBC QN AUTO: 29.1 PG (ref 27–33)
MCHC RBC AUTO-ENTMCNC: 32.9 G/DL (ref 32–36.5)
MCV RBC AUTO: 88.6 FL (ref 80–96)
METHADONE UR QL SCN: NEGATIVE
OPIATES UR QL SCN: NEGATIVE
PCP UR QL SCN: NEGATIVE
PLATELET # BLD AUTO: 347 10^3/UL (ref 150–450)
POTASSIUM SERPL-SCNC: 4.2 MEQ/L (ref 3.5–5.1)
PROT SERPL-MCNC: 6.7 GM/DL (ref 6.4–8.2)
RBC # BLD AUTO: 5.53 10^6/UL (ref 4.3–6.1)
SALICYLATES SERPL-MCNC: 3.5 MG/DL (ref 5–30)
SODIUM SERPL-SCNC: 142 MEQ/L (ref 136–145)
TSH SERPL DL<=0.005 MIU/L-ACNC: 0.56 UIU/ML (ref 0.36–3.74)
WBC # BLD AUTO: 8.6 10^3/UL (ref 4–10)

## 2019-03-06 RX ADMIN — NICOTINE SCH PATCH: 21 PATCH, EXTENDED RELEASE TRANSDERMAL at 09:00

## 2019-03-07 VITALS — DIASTOLIC BLOOD PRESSURE: 81 MMHG | SYSTOLIC BLOOD PRESSURE: 148 MMHG

## 2019-03-07 VITALS — SYSTOLIC BLOOD PRESSURE: 139 MMHG | DIASTOLIC BLOOD PRESSURE: 83 MMHG

## 2019-03-07 RX ADMIN — NICOTINE SCH PATCH: 21 PATCH, EXTENDED RELEASE TRANSDERMAL at 09:00

## 2019-03-07 RX ADMIN — DULOXETINE HYDROCHLORIDE SCH MG: 30 CAPSULE, DELAYED RELEASE ORAL at 11:36
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
## 2019-03-07 NOTE — HPEPDOC
[facility] Medical History & Physical


Date of Admission


Mar 6, 2019





History and Physical


PCP: None


ATTENDING: Dr. Rita Chang





HPI: 43yoM admitted to [facility] for unspecified depression, being medically examined

today.


Pt reported in ED that he had stopped his psych meds about 8 mo ago because he 

did not feel he needed them. 


Pt reports no verbalized concerns at this time. 


Denies any fevers, chills, weakness, fatigue, HA, CP, SOB, cough, palpitations, 

abdominal pain, N/V/D or changes in bowel or bladder habits.





PMHx: 


Anxiety


Depression


H/O SI


Cervical DDD


Substance use


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 





PSHX:


Left eye foreign body








SOCHX:


Resides in: [facility]


Marital Status: Single


Kids: 2


Employment: unemployed


Tobacco use: 1 ppd


ETOH: denies


Illicit Drugs: Marijuana daily. History of Methamphetamine. Attended re

habilitation at [facility] in past.


IV Drug Use: Methamphetamine in past. 


Tattoos done unprofessionally: Denies 





FAMHX:


Mother: Alive, well  


Father: Alive, well


Siblings: Alive, well


Children: Alive, well


Unexpected deaths due to medical reasons: None.





ROS:


As noted in HPI, otherwise 11pt ROS of systems reviewed and unremarkable. Pt 

denies any recent bleeding, epistaxis, bruising. 


                 


PE:      


GEN:  43yoM, appears stated age. Appears unkept. No acute distress. Alert and o

riented x 3. Flat affect, 1-2 word answers, avoids eye contact. 


HEENT: Normocephalic, atraumatic. Pupils are equal, round, and reactive to 

light. Extraocular movements are intact. No nystagmus appreciated. Sclera are 

nonicteric. Conjunctiva without injection. Nose midline. Nasal turbinates 

without bogginess. EACs both patent BL. TMs both visualized and gray with good 

cone of light, no bulging or erythema. No facial asymmetry. Moist mucous 

membranes. Dentition fair. Pharynx pink and moist. Neck supple, trachea midline.

No lymphadenopathy or thyromegaly appreciated. 


CHEST: Regular rate and rhythm, +S1, +S2


LUNGS: Clear to auscultation bilaterally. No wheezes, rales, or rhonchi. 

Breathing appears symmetric and easy. Patient is speaking in full sentences. No 

accessory muscle use.


ABD: Round, soft, mild tenderness in the left lower quadrant and right lower 

quadrant diffusely, non-distended. +Bowel sounds throughout. No rebound or 

guarding. No costovertebral angle tenderness.


EXT: Pulses 2+ bilaterally dorsalis pedis and radial. No lower extremity edema 

appreciated.


SKIN: Pink, dry, warm. Capillary refill <2sec. No rashes.


NEURO: Alert and oriented x 3. Cranial nerves III-XII are intact. No focal 

deficits appreciated. 





EKG: 


SINUS BRADYCARDIA


otherwise within normal limits


Electronically Signed On 8- 16:37:12 EDT by Cristiano Ng





A&P:  43yoM admitted to [facility] for unspecified depression. 


1. Psych. Plan per Psychiatry. EKG on file. 


2. History of cervical DDD. 


Tylenol as needed. 


Patient denies any pain at this time.


3. History of IVDU. 


Patient declines HIV/hepatitis screening. 


Patient states previous testing was negative.


4. Follow up. No Primary Care Provider. Will attempt to establish PCP on 

discharge.


5. Substance use. Management per psychiatry. 


6. Staff member Ed present throughout exam.





Vital Signs





Vital Signs








  Date Time  Temp Pulse Resp B/P (MAP) Pulse Ox O2 Delivery O2 Flow Rate FiO2


 


3/7/19 06:39 99.1 56 14 148/81 (103)    


 


3/6/19 15:23     97 Room Air  











Laboratory Data


Labs 24H


Laboratory Tests 2


3/6/19 12:14: 


Urine Amphetamines Screen NEGATIVE, Urine Benzodiazepines Screen NEGATIVE, Urine

Opiates Screen NEGATIVE, Urine Methadone Screen NEGATIVE, Urine Barbiturates 

Screen NEGATIVE, Urine Phencyclidine Screen NEGATIVE, Urine Cocaine Metabolite 

Screen POSITIVEH, Urine Cannabinoids Screen POSITIVEH


3/6/19 12:59: 


Nucleated Red Blood Cells % (auto) 0.0, Anion Gap 2L, Glomerular Filtration Rate

> 60.0, Calcium Level 8.4L, Aspartate Amino Transf (AST/SGOT) 17, Alanine 

Aminotransferase (ALT/SGPT) 19, Alkaline Phosphatase 51, Total Bilirubin 0.2, 

Direct Bilirubin 0.1, Total Protein 6.7, Albumin 3.8, Albumin/Globulin Ratio 

1.31, Thyroid Stimulating Hormone (TSH) 0.560, Salicylates Level 3.5L, 

Acetaminophen Level < 2.0L, Ethyl Alcohol Level < 0.003


CBC/BMP


Laboratory Tests


3/6/19 12:59








Red Blood Count 5.53, Mean Corpuscular Volume 88.6, Mean Corpuscular Hemoglobin 

29.1, Mean Corpuscular Hemoglobin Concent 32.9, Red Cell Distribution Width 13.2





Home Medications


No Active Prescriptions or Reported Meds





Allergies


Coded Allergies:  


     Penicillins (Verified  Allergy, Intermediate, HIVES, 4/4/13)


     Penicillins Cross Reactors (Verified  Allergy, Intermediate, HIVES, 4/4/13)


     Quetiapine (Verified  Adverse Reaction, Intermediate, Insomnia and restless

legs, 5/18/17)


     Trazodone (Verified  Adverse Reaction, Intermediate, Insomnia and restless 

legs, 5/18/17)











Alva Santiago               Mar 7, 2019 09:42

## 2019-03-08 VITALS — SYSTOLIC BLOOD PRESSURE: 132 MMHG | DIASTOLIC BLOOD PRESSURE: 83 MMHG

## 2019-03-08 VITALS — SYSTOLIC BLOOD PRESSURE: 102 MMHG | DIASTOLIC BLOOD PRESSURE: 64 MMHG

## 2019-03-08 RX ADMIN — DULOXETINE HYDROCHLORIDE SCH MG: 30 CAPSULE, DELAYED RELEASE ORAL at 09:02

## 2019-03-08 RX ADMIN — MIRTAZAPINE SCH MG: 15 TABLET, FILM COATED ORAL at 21:00

## 2019-03-08 NOTE — MHIPNPDOC
[facility] Progress Note


Progress Note


DATE OF SERVICE: 3/8/19





HISTORY: 


CHIEF COMPLAINT: "I can't live like this anymore"


 


HISTORY OF PRESENT ILLNESS: Patient is a 43-year-old male, who according to ED 

report: PT comes to ED stating he has been depressed overwhelmed for over a 

month. He


states he does not wish to actively kill himself but is hopeful that he wouldn't

wake up in the morning or that a truck would hit and kill him. He states he 

ended a relationship about a month ago as he didn't want to deal with it 

anymore. He currently is suffering with racing thoughts, paranoia and social 

anxiety to the point he rarely leaves his home. PT has flat affect and feels if 

discharged he will be at risk of harming himself "I can't live like this 

anymore". PT decided about 8 months ago to wean himself off all of his 

medications without notifying his psychiatrist via the [facility] as he no longer felt


he needed them. PT cannot CFS"





VITAL SIGNS: See below.





NEW TEST RESULTS: See below





CURRENT MEDICATIONS: See below.





MENTAL STATUS EXAMINATION:


General Appearance:  unkempt, appears stated age, hospital scrubs/clothing


Build:  average


Demeanor:  defiant, angry, irritable


Eye Contact:  poor


Activity:  psychomotor retardation, patient is laying in be and states: "I won't

get up, I won't attend groups"


Behavior:  defiant, angry, uncooperative


Speech:  clear, spontaneous, normal rate. rhythm, tone an volume


Mood: angry/irritable


Mood


angry


Affect:  congruent with mood


Thought Process:  logical/linear, intact


Thought Content (Delusions):  admits to feel "paranoid"at times


Thought Content (Other):  admits to have suicidal thoughts where he wishes to be

dead (passive SI)


Thought Content (Aggressive):  none reported


Perception (Hallucinations):  none reported


Perception (Other):  flashbacks


Cognition (Impairment of):  none reported


Cognition(Intelligence Est.):  average


Oriented:  Awake, Alert, Oriented times three


Insight:  poor


Judgment:  Fair


Psychosis:  Denies





Diagnoses


1. Other specified mood disorder


2. Social Anxiety disorder


3. Panic disorder


4. PTSD


5. cocaine use disorder


6. Marihuana use disorder


7. R/O substance induced mood disorder





 


ASSESSMENT: Patient is angry and irritable today. He is laying in bed saying 

that he won't attend groups, he says nobody will make him attend, complains 

about how cold the lounge feels, about the noise. Says that he is going to 

request being discharged and transferred somewhere else because "this place 

sucks". Patient tested positive for cocaine and marihuana while at the ED. 

Yesterday he reported some symptoms that could be congruent with bipolar 

disorder, or with borderline personality or with a substance induced mood 

disorder. I believe he might be experiencing the "cocaine crash", depression 

that people experience after stopping cocaine and probably will feel irritable 

and depressed for some time. Will increase Abilify, will start him on Remeron 30

mgs PO QHS at bedtime and Inderal 10 mgs PO TIDP for anxiety/agitation/panic 

attacks





MANAGEMENT PLAN: 


Cymbalta 30 mgs PO QAM


Abilify 2.5 mgs PO QAM


Abilify 5 mgs Po QHS


Remeron 30 mgs PO QHS


Propranolol 10 mgs PO TIDP for anxiety/agitation/panic attacks





TIME SPENT: 15 minutes.





Vital Signs





Vital Signs








  Date Time  Temp Pulse Resp B/P (MAP) Pulse Ox O2 Delivery O2 Flow Rate FiO2


 


3/8/19 18:04 98.1 62 16 132/83 (99)    


 


3/6/19 15:23     97 Room Air  











Current Medications





Current Medications


Acetaminophen (Tylenol Tab) 650 mg Q6HP  PRN PO HEADACHE or DISCOMFORT;  Start 

3/6/19 at 15:00


Al Hydrox/Mg Hydrox/Simethicone (Mylanta) 30 ml Q4HP  PRN PO HEARTBURN/INDIG

ESTION;  Start 3/6/19 at 15:00


Aripiprazole (AbiLIFY) 2.5 mg BID PO  Last administered on 3/8/19at 09:02;  

Start 3/7/19 at 09:00;  Stop 3/8/19 at 13:19;  Status DC


Aripiprazole (AbiLIFY) 2.5 mg QAM PO ;  Start 3/9/19 at 09:00


Aripiprazole (AbiLIFY) 5 mg QHS PO  Last administered on 3/8/19at 21:07;  Start 

3/8/19 at 21:00


Duloxetine HCl (Cymbalta) 30 mg QAM PO  Last administered on 3/8/19at 09:02;  

Start 3/7/19 at 09:00;  Stop 3/8/19 at 13:29;  Status DC


Duloxetine HCl (Cymbalta) 40 mg DAILY PO ;  Start 3/9/19 at 09:00


Home Med (Med Rec Complete!)  ASDIRECTED XX ;  Start 3/6/19 at 15:30;  Stop 

3/6/19 at 15:30;  Status DC


Magnesium Hydroxide (Milk Of Magnesia) 30 ml DAILYPRN  PRN PO CONSTIPATION;  

Start 3/6/19 at 15:00


Nicotine (Nicoderm Cq 21mg) 1 patch DAILY TD ;  Start 3/6/19 at 09:00;  Stop 

3/7/19 at 10:39;  Status DC


Trazodone HCl (Desyrel) 50 mg QHSP  PRN PO INSOMNIA;  Start 3/6/19 at 15:00;  

Status Cancel





Allergies


Coded Allergies:  


     Penicillins (Verified  Allergy, Intermediate, HIVES, 4/4/13)


     Penicillins Cross Reactors (Verified  Allergy, Intermediate, HIVES, 4/4/13)


     Quetiapine (Verified  Adverse Reaction, Intermediate, Insomnia and restless

legs, 5/18/17)


     Trazodone (Verified  Adverse Reaction, Intermediate, Insomnia and restless 

legs, 5/18/17)











TOÑO ENNIS MD              Mar 8, 2019 21:24

## 2019-03-09 VITALS — SYSTOLIC BLOOD PRESSURE: 140 MMHG | DIASTOLIC BLOOD PRESSURE: 81 MMHG

## 2019-03-09 VITALS — DIASTOLIC BLOOD PRESSURE: 76 MMHG | SYSTOLIC BLOOD PRESSURE: 144 MMHG

## 2019-03-09 RX ADMIN — NICOTINE SCH PATCH: 21 PATCH, EXTENDED RELEASE TRANSDERMAL at 08:10

## 2019-03-09 RX ADMIN — MIRTAZAPINE SCH MG: 15 TABLET, FILM COATED ORAL at 20:33

## 2019-03-09 RX ADMIN — DULOXETINE SCH MG: 20 CAPSULE, DELAYED RELEASE ORAL at 08:12

## 2019-03-10 VITALS — DIASTOLIC BLOOD PRESSURE: 80 MMHG | SYSTOLIC BLOOD PRESSURE: 124 MMHG

## 2019-03-10 VITALS — SYSTOLIC BLOOD PRESSURE: 142 MMHG | DIASTOLIC BLOOD PRESSURE: 90 MMHG

## 2019-03-10 RX ADMIN — NICOTINE SCH PATCH: 21 PATCH, EXTENDED RELEASE TRANSDERMAL at 08:41

## 2019-03-10 RX ADMIN — MIRTAZAPINE SCH MG: 15 TABLET, FILM COATED ORAL at 20:28

## 2019-03-10 RX ADMIN — DULOXETINE SCH MG: 20 CAPSULE, DELAYED RELEASE ORAL at 08:42

## 2019-03-11 VITALS — DIASTOLIC BLOOD PRESSURE: 78 MMHG | SYSTOLIC BLOOD PRESSURE: 134 MMHG

## 2019-03-11 VITALS — DIASTOLIC BLOOD PRESSURE: 78 MMHG | SYSTOLIC BLOOD PRESSURE: 136 MMHG

## 2019-03-11 VITALS — DIASTOLIC BLOOD PRESSURE: 88 MMHG | SYSTOLIC BLOOD PRESSURE: 144 MMHG

## 2019-03-11 RX ADMIN — Medication PRN EACH: at 08:34

## 2019-03-11 RX ADMIN — DULOXETINE SCH MG: 20 CAPSULE, DELAYED RELEASE ORAL at 08:35

## 2019-03-11 RX ADMIN — NICOTINE SCH PATCH: 21 PATCH, EXTENDED RELEASE TRANSDERMAL at 08:36

## 2019-03-11 NOTE — MHIPNPDOC
[facility] Progress Note


Progress Note


DATE OF SERVICE: 3/11/19





HISTORY: 


CHIEF COMPLAINT: "I can't live like this anymore"


 


HISTORY OF PRESENT ILLNESS: Patient is a 43-year-old male, who according to ED 

report: PT comes to ED stating he has been depressed overwhelmed for over a 

month. He


states he does not wish to actively kill himself but is hopeful that he wouldn't

wake up in the morning or that a truck would hit and kill him. He states he 

ended a relationship about a month ago as he didn't want to deal with it 

anymore. He currently is suffering with racing thoughts, paranoia and social 

anxiety to the point he rarely leaves his home. PT has flat affect and feels if 

discharged he will be at risk of harming himself "I can't live like this 

anymore". PT decided about 8 months ago to wean himself off all of his 

medications without notifying his psychiatrist via the [facility] as he no longer felt


he needed them. PT cannot CFS"





VITAL SIGNS: See below.





NEW TEST RESULTS: See below





CURRENT MEDICATIONS: See below.





MENTAL STATUS EXAMINATION:


General Appearance:  unkempt, appears stated age, hospital scrubs/clothing


Build:  average


Demeanor:  pleasant, cooperative


Eye Contact:  poor


Activity:  active in the Unit, he has been attending groups and socializing in 

the Unit


Behavior:  cooperative, calm


Speech:  clear, spontaneous, normal rate. rhythm, tone an volume


Mood: angry/irritable


Mood


euthymic


Affect:  congruent with mood


Thought Process:  logical/linear, intact


Thought Content (Delusions):  he reports he has not been feeling paranoid lately


Thought Content (Other):  denies SI/HI


Thought Content (Aggressive):  none reported


Perception (Hallucinations):  none reported


Perception (Other):  flashbacks


Cognition (Impairment of):  none reported


Cognition(Intelligence Est.):  average


Oriented:  Awake, Alert, Oriented times three


Insight:  poor


Judgment:  Fair


Psychosis:  Denies





Diagnoses


1. Other specified mood disorder


2. Social Anxiety disorder


3. Panic disorder


4. PTSD


5. cocaine use disorder


6. Marihuana use disorder


7. R/O substance induced mood disorder





 


ASSESSMENT: Patient was playing the guitar at the Activity group, he was 

pleasant and cooperative, totally different than Friday. His speech was 

spontaneous and fluent. He said he had a good response to medications, he 

requested Remeron to be PRN instead of scheduled. He said his anxiety and 

depression were both, decreased, he was goal orientated, he was deterined, he 

said, not to smoke cigarettes again. 





MANAGEMENT PLAN: 


Cymbalta 30 mgs PO QAM


Abilify 2.5 mgs PO QAM


Abilify 5 mgs Po QHS


Remeron 30 mgs PO QHS


Propranolol 10 mgs PO TIDP for anxiety/agitation/panic attacks





TIME SPENT: 15 minutes.





Vital Signs





Vital Signs








  Date Time  Temp Pulse Resp B/P (MAP) Pulse Ox O2 Delivery O2 Flow Rate FiO2


 


3/11/19 18:00 98.1 86 16 136/78 (97)    


 


3/6/19 15:23     97 Room Air  











Current Medications





Current Medications


Acetaminophen (Tylenol Tab) 650 mg Q6HP  PRN PO HEADACHE or DISCOMFORT;  Start 

3/6/19 at 15:00


Al Hydrox/Mg Hydrox/Simethicone (Mylanta) 30 ml Q4HP  PRN PO 

HEARTBURN/INDIGESTION;  Start 3/6/19 at 15:00


Aripiprazole (AbiLIFY) 2.5 mg BID PO  Last administered on 3/8/19at 09:02;  

Start 3/7/19 at 09:00;  Stop 3/8/19 at 13:19;  Status DC


Aripiprazole (AbiLIFY) 2.5 mg QAM PO  Last administered on 3/11/19at 08:35;  

Start 3/9/19 at 09:00


Aripiprazole (AbiLIFY) 5 mg QHS PO  Last administered on 3/11/19at 20:36;  Start

3/8/19 at 21:00


Duloxetine HCl (Cymbalta) 30 mg QAM PO  Last administered on 3/8/19at 09:02;  

Start 3/7/19 at 09:00;  Stop 3/8/19 at 13:29;  Status DC


Duloxetine HCl (Cymbalta) 40 mg DAILY PO  Last administered on 3/11/19at 08:35; 

Start 3/9/19 at 09:00


Home Med (Med Rec Complete!)  ASDIRECTED XX ;  Start 3/6/19 at 15:30;  Stop 

3/6/19 at 15:30;  Status DC


Magnesium Hydroxide (Milk Of Magnesia) 30 ml DAILYPRN  PRN PO CONSTIPATION;  

Start 3/6/19 at 15:00


Mirtazapine (Remeron) 30 mg QHS PO  Last administered on 3/10/19at 20:28;  Start

3/8/19 at 21:00;  Stop 3/11/19 at 14:20;  Status DC


Mirtazapine (Remeron) 30 mg QHSP  PRN PO INSOMNIA;  Start 3/11/19 at 14:30


Nicotine (Nicoderm Cq 21mg) 1 patch DAILY TD ;  Start 3/6/19 at 09:00;  Stop 

3/7/19 at 10:39;  Status DC


Nicotine (Nicoderm Cq 21mg) 1 patch DAILY TD ;  Start 3/9/19 at 09:00


Propranolol HCl (Inderal) 10 mg TIDP  PRN PO ANXIETY/AGITATION;  Start 3/8/19 at

21:45


Trazodone HCl (Desyrel) 50 mg QHSP  PRN PO INSOMNIA;  Start 3/6/19 at 15:00;  

Status Cancel





Allergies


Coded Allergies:  


     Penicillins (Verified  Allergy, Intermediate, HIVES, 4/4/13)


     Penicillins Cross Reactors (Verified  Allergy, Intermediate, HIVES, 4/4/13)


     Quetiapine (Verified  Adverse Reaction, Intermediate, Insomnia and restless

legs, 5/18/17)


     Trazodone (Verified  Adverse Reaction, Intermediate, Insomnia and restless 

legs, 5/18/17)











TOÑO ENNIS MD             Mar 11, 2019 21:51

## 2019-03-11 NOTE — IPN
DATE:   03/10/2019

 

The patient today states that he is still having a lot of anxiety, but he is not

as depressed.  He says that his mood is about a 5 out of 10 where the closest to

10 is the most depressed.  Today, he says that he is trying to keep himself up as

opposed to yesterday when he was sleeping more through the day.

 

MENTAL STATUS EXAMINATION:

He is alert and oriented times three.  Eye contact is fair.  Psychomotor activity

is decreased. He is verbally spontaneous.  There is no formal thought disorder

noted.  Mood is anxious and depressed.  Affect is full range and appropriate.  He

is not psychotic, suicidal or homicidal.  Concentration is fair.  Memory intact.

Insight and judgment fair.

 

DIAGNOSES:

1.  Unspecified mood disorder.

2.  Social anxiety.

3.  Panic disorder.

4.  Posttraumatic stress disorder (PTSD).

 

 

TREATMENT PLAN:  We will continue to monitor the patient for continued elevation

and stabilization of his mood and for continued resolution of any suicidal

ideations. We will await further response from his Cymbalta and Abilify, which

were just started.

## 2019-03-11 NOTE — MHIPN
DATE:  03/09/2019

 

The patient today states "My anxiety is pretty high."  He is also depressed.  His

mood is 8/10, where the closest to 10 is the most depressed.  He is feeling

hopeless and helpless.  He says he has been sleeping most of the day.

 

MENTAL STATUS EXAMINATION:

He is alert and oriented times three.  Eye contact is fair.  Psychomotor activity

is decreased.  He is verbally spontaneous.  There is no formal thought disorder

noted.  Mood is depressed and anxious.  Affect full range and appropriate.  He is

not psychotic, suicidal, homicidal.  Concentration is fair.  Memory intact.

Insight and judgment is fair.

 

DIAGNOSES:

1.  Other specified mood disorder.

2.  Social anxiety panic disorder.

3.  Posttraumatic stress disorder (PTSD).

 

TREATMENT PLAN:

The patient just has started on Cymbalta and Abilify and so we will wait for the

clinical response to those medications and we will continue to monitor for

continued elevation and stabilization of his mood and continued resolution of

suicidal ideation.

## 2019-03-12 VITALS — DIASTOLIC BLOOD PRESSURE: 77 MMHG | SYSTOLIC BLOOD PRESSURE: 137 MMHG

## 2019-03-12 RX ADMIN — DULOXETINE SCH MG: 20 CAPSULE, DELAYED RELEASE ORAL at 08:17

## 2019-03-12 RX ADMIN — NICOTINE SCH PATCH: 21 PATCH, EXTENDED RELEASE TRANSDERMAL at 08:17

## 2019-03-12 RX ADMIN — Medication PRN EACH: at 08:16

## 2019-03-17 NOTE — MHDSPDOC
[facility] Discharge Summary


Discharge Summary


DATE OF ADMISSION: Mar 6, 2019 at 14:47 


DATE OF DISCHARGE: Mar 12, 2019 at 13:20





DISCHARGE DIAGNOSES:


1. Other specified mood disorder


2. Social Anxiety disorder


3. Panic disorder


4. PTSD


5. cocaine use disorder


6. Marihuana use disorder


7. R/O substance induced mood disorder





REASON FOR ADMISSION: CHIEF COMPLAINT: "I can't live like this anymore"


 


HISTORY OF PRESENT ILLNESS: Patient is a 43-year-old male, who according to ED 

report: PT comes to ED stating he has been depressed overwhelmed for over a 

month. He


states he does not wish to actively kill himself but is hopeful that he wouldn't

wake up in the morning or that a truck would hit and kill him. He states he 

ended a relationship about a month ago as he didn't want to deal with it 

anymore. He currently is suffering with racing thoughts, paranoia and social 

anxiety to the point he rarely leaves his home. PT has flat affect and feels if 

discharged he will be at risk of harming himself "I can't live like this 

anymore". PT decided about 8 months ago to wean himself off all of his 

medications without notifying his psychiatrist via the [facility] as he no longer felt


he needed them. PT cannot CFS"





CONSULTANTS INVOLVED: None





TREATMENT AND PROGRESS ON THE UNIT : Patient was very depressed, had poor eye 

contact, flat/sad affect, helplessness, hopelessness, low energy. During his 

second day of hospitalization, he was feeling irritable, angry, said he didn't 

like [facility], he complained of being cold, bored, angry, but his improved during 

the next couple of days, he was happier, he was enjoying playing the [employer] at 

the activity room, he was interacting with other patients, was pleasant and 

cooperative, started going to groups and he had previously said he was not going

to attend. Patient had a good response to medications. TW felt that he fulfilled

criteria for PTSD and he had failed many antidepressants before but that 

probably was because he had used drugs for a long period of time and the drugs 

most likely interfered with the medications. He received Abilify to boost the an

tidepressant effect of Cymbalta, which was the first time he was using 

(Cymbalta), since he had a history of antidepressants resistance. He also 

received Propranolol PRN for severe anxiety/panic attacks and Mirtazapine 30 mgs

PO QHSP for insomnia.





HOSPITAL COURSE: As above





DISCHARGE ASSESSMENT: Patient was not homicidal, not suicidal and not psychotic.

He was future orientated, he wanted to quit drugs but he thought he would not 

benefit from going to Rehab because he had been in Rehab many times and he had 

failed, he thought he didn't have purpose before and now he wanted to change for

him and for his family.





MENTAL STATUS EXAMINATION ON DISCHARGE: 


General Appearance:  unkempt, appears stated age, hospital scrubs/clothing


Build:  average


Demeanor:  pleasant, cooperative


Eye Contact:  poor


Activity:  active in the Unit, he has been attending groups and socializing in 

the Unit


Behavior:  cooperative, calm


Speech:  clear, spontaneous, normal rate. rhythm, tone an volume


Mood: angry/irritable


Mood


euthymic


Affect:  congruent with mood


Thought Process:  logical/linear, intact


Thought Content (Delusions):  he reports he has not been feeling paranoid lately


Thought Content (Other):  denies SI/HI


Thought Content (Aggressive):  none reported


Perception (Hallucinations):  none reported


Perception (Other):  flashbacks


Cognition (Impairment of):  none reported


Cognition(Intelligence Est.):  average


Oriented:  Awake, Alert, Oriented times three


Insight:  improved


Judgment:  Fair


Psychosis:  Denies





Diagnoses


1. Other specified mood disorder


2. Social Anxiety disorder


3. Panic disorder


4. PTSD


5. cocaine use disorder


6. Marihuana use disorder


7. R/O substance induced mood disorder








MEDICATIONS ON DISCHARGE:


Scheduled


Aripiprazole (Aripiprazole) 5 Mg Tab, 5 MG PO QHS for mood, #7


Aripiprazole (Aripiprazole) 5 Mg Tab, 2.5 MG PO QAM for mood, #4


Duloxetine HCl (Cymbalta) 20 Mg Cap, 40 MG PO DAILY for depression, #14


Nicotine (Nicotine Transdermal Syst) 21 Mg/24 Hr Dis, 1 PATCH TD DAILY for 

nicotine withdrawals, #7





Scheduled PRN


Mirtazapine (Mirtazapine) 15 Mg Tab, 30 MG PO QHSP PRN for INSOMNIA, #14


Propranolol HCl (Propranolol HCl) 10 Mg Tab, 10 MG PO TIDP PRN for 

ANXIETY/AGITATION, #21





PLAN/FOLLOWUP ARRANGEMENTS: 


Follow Up Care Education Label


* Mental Health Appt 1


* Mental Health 


   [facility]


* Established With This Provider 


   No


* Therapist 


   SHAAN WEISS LCSW


* Date 


   Mar 13, 2019


* Time 


   09:15


* Phone Number 


   [phone]


Follow Up Care Education Label


* Chemical Dependency Appt1


* Chemical Dependency 


   [facility]


* Address of Clinic or Practice 


   [address] [place] [address]


* Phone Number 


   [phone]


* Additional information 


   Walk in hours from Monday through Friday 8am-4pm


Follow Up Care Education Label


* Chemical Dependency Appt2


* Address of Clinic or Practice 


   [address] [place] [address]


* Phone Number 


   [phone]


* Additional information 


   Walk in hours Monday through Friday from 730am-1230pm


Follow Up Care Education Label


* Smoking Cessation


* Mental Health 


   [facility]


* Smoking Cessation 


   [facility] Smoking Cessation


* Additional information 


   see attached form


Follow Up Care Education Label


* Medical


* Medical Follow Up 


   [facility]: DR. SALVADOR MCGOVERN


* Established With This Provider 


   No


   NEW PATIENT APPOINTMENT


* Date 


   Mar 19, 2019


* Time 


   15:00


* Phone Number 


   [phone]








The amount of time spent in the coordination of care for this patient was 

approximately 30 minutes.





Vital Signs/I&Os





Vital Signs








  Date Time  Temp Pulse Resp B/P (MAP) Pulse Ox O2 Delivery O2 Flow Rate FiO2


 


3/12/19 08:26      Room Air  


 


3/12/19 06:45 97.3 54 14 137/77 (97)    











Medications


Scheduled


Aripiprazole (Aripiprazole) 5 Mg Tab, 5 MG PO QHS for mood, #7


Aripiprazole (Aripiprazole) 5 Mg Tab, 2.5 MG PO QAM for mood, #4


Duloxetine HCl (Cymbalta) 20 Mg Cap, 40 MG PO DAILY for depression, #14


Nicotine (Nicotine Transdermal Syst) 21 Mg/24 Hr Dis, 1 PATCH TD DAILY for 

nicotine withdrawals, #7





Scheduled PRN


Mirtazapine (Mirtazapine) 15 Mg Tab, 30 MG PO QHSP PRN for INSOMNIA, #14


Propranolol HCl (Propranolol HCl) 10 Mg Tab, 10 MG PO TIDP PRN for ANXIETY/AGIT

ATION, #21





Allergies


Coded Allergies:  


     Penicillins (Verified  Allergy, Intermediate, HIVES, 4/4/13)


     Penicillins Cross Reactors (Verified  Allergy, Intermediate, HIVES, 4/4/13)


     Quetiapine (Verified  Adverse Reaction, Intermediate, Insomnia and restless

legs, 5/18/17)


     Trazodone (Verified  Adverse Reaction, Intermediate, Insomnia and restless 

legs, 5/18/17)











TOÑO ENNIS MD             Mar 17, 2019 16:59

## 2020-12-28 ENCOUNTER — HOSPITAL ENCOUNTER (OUTPATIENT)
Dept: HOSPITAL 53 - M LAB REF | Age: 45
End: 2020-12-28
Attending: PHYSICIAN ASSISTANT
Payer: COMMERCIAL

## 2020-12-28 DIAGNOSIS — Z11.59: Primary | ICD-10-CM

## 2021-02-16 ENCOUNTER — HOSPITAL ENCOUNTER (INPATIENT)
Dept: HOSPITAL 53 - M ED | Age: 46
LOS: 7 days | Discharge: HOME | DRG: 754 | End: 2021-02-23
Admitting: PSYCHIATRY & NEUROLOGY
Payer: COMMERCIAL

## 2021-02-16 VITALS — WEIGHT: 157.41 LBS | HEIGHT: 76 IN | BODY MASS INDEX: 19.17 KG/M2

## 2021-02-16 VITALS — DIASTOLIC BLOOD PRESSURE: 76 MMHG | SYSTOLIC BLOOD PRESSURE: 118 MMHG

## 2021-02-16 DIAGNOSIS — F41.9: ICD-10-CM

## 2021-02-16 DIAGNOSIS — Z88.0: ICD-10-CM

## 2021-02-16 DIAGNOSIS — R45.851: ICD-10-CM

## 2021-02-16 DIAGNOSIS — F32.9: Primary | ICD-10-CM

## 2021-02-16 DIAGNOSIS — Z88.8: ICD-10-CM

## 2021-02-16 DIAGNOSIS — F17.200: ICD-10-CM

## 2021-02-16 DIAGNOSIS — D68.2: ICD-10-CM

## 2021-02-16 DIAGNOSIS — M50.90: ICD-10-CM

## 2021-02-16 LAB
ALBUMIN SERPL BCG-MCNC: 4 GM/DL (ref 3.2–5.2)
ALT SERPL W P-5'-P-CCNC: 24 U/L (ref 12–78)
AMPHETAMINES UR QL SCN: NEGATIVE
APAP SERPL-MCNC: < 2 UG/ML (ref 10–30)
BARBITURATES UR QL SCN: NEGATIVE
BENZODIAZ UR QL SCN: NEGATIVE
BILIRUB CONJ SERPL-MCNC: < 0.1 MG/DL (ref 0–0.2)
BILIRUB SERPL-MCNC: 0.1 MG/DL (ref 0.2–1)
BUN SERPL-MCNC: 14 MG/DL (ref 7–18)
BZE UR QL SCN: NEGATIVE
CALCIUM SERPL-MCNC: 8.6 MG/DL (ref 8.5–10.1)
CANNABINOIDS UR QL SCN: POSITIVE
CHLORIDE SERPL-SCNC: 113 MEQ/L (ref 98–107)
CO2 SERPL-SCNC: 23 MEQ/L (ref 21–32)
CREAT SERPL-MCNC: 0.89 MG/DL (ref 0.7–1.3)
ETHANOL SERPL-MCNC: 0.13 % (ref 0–0.01)
GFR SERPL CREATININE-BSD FRML MDRD: > 60 ML/MIN/{1.73_M2} (ref 60–?)
GLUCOSE SERPL-MCNC: 101 MG/DL (ref 70–100)
HCT VFR BLD AUTO: 44.9 % (ref 42–52)
HGB BLD-MCNC: 14.5 G/DL (ref 13.5–17.5)
MCH RBC QN AUTO: 29.7 PG (ref 27–33)
MCHC RBC AUTO-ENTMCNC: 32.3 G/DL (ref 32–36.5)
MCV RBC AUTO: 91.8 FL (ref 80–96)
METHADONE UR QL SCN: NEGATIVE
OPIATES UR QL SCN: NEGATIVE
PCP UR QL SCN: NEGATIVE
PLATELET # BLD AUTO: 366 10^3/UL (ref 150–450)
POTASSIUM SERPL-SCNC: 4 MEQ/L (ref 3.5–5.1)
PROT SERPL-MCNC: 6.8 GM/DL (ref 6.4–8.2)
RBC # BLD AUTO: 4.89 10^6/UL (ref 4.3–6.1)
RSV RNA NPH QL NAA+PROBE: NEGATIVE
SALICYLATES SERPL-MCNC: 3.3 MG/DL (ref 5–30)
SODIUM SERPL-SCNC: 144 MEQ/L (ref 136–145)
TSH SERPL DL<=0.005 MIU/L-ACNC: 2.34 UIU/ML (ref 0.36–3.74)
WBC # BLD AUTO: 9.9 10^3/UL (ref 4–10)

## 2021-02-16 NOTE — CCD
Summarization Of Episode

                             Created on: 2021



PEPE CARROLL

External Reference #: [id number]

: 1975

Sex: Male



Demographics





                          Address                   [address]

[place]  [address]

 

                          Home Phone                [phone]

 

                          Preferred Language        English

 

                          Marital Status            Single

 

                          Zoroastrian Affiliation     NONE

 

                          Race                      White

 

                          Ethnic Group              Not  or 





Author





                          Author                    [facility]

 

                          Organization              [facility]

 

                          Address                   Unknown

 

                          Phone                     Unavailable







Support





                Name            Relationship    Address         Phone

 

                    ON SITE SOLAR       Next Of Kin         [address] [place], [place]  [address] [phone]

 

                    ON SIGHT SOLAR      Next Of Kin         [place]

[place]  [address]                     [phone]

 

                    VALUE HOMES         Next Of Kin         [address]

[place]  [address]                    [phone]

 

                    CRYSTAL RESTAURANT  [place]  [address]                    [place]

 

                    Stella Abernathy Next Of Kin         [address] [place]  [address]                    [phone]

 

                    Natalie Almonte    Next Of Kin         [address] [place], [place]  [id number]                [phone]

 

                    CURRENT [employer]PLICATIONS Next Of Kin         [address] [place]  [address]                    [phone]

 

                    CINDYFERNANDO MCBRIDE   Next Of Kin         [address] [place]  [address]                    [phone]

 

                    DUNKINARSE          Next Of Kin         [address] [place]  [address]                    [phone]

 

                UE              Next Of Kin     Unknown         Unavailable

 

                    YVES RODRIGUEZ       Next Of Kin         [address] 

[place]  [address]                    [phone]







Care Team Providers





                    Care Team Member Name Role                Phone

 

                    Stella Person FNP Unavailable         Unavailable



                                  



Re-disclosure Warning

          The records that you are about to access may contain information from 
federally-assisted alcohol or drug abuse programs. If such information is 
present, then the following federally mandated warning applies: This information
has been disclosed to you from records protected by federal confidentiality 
rules (42 CFR part 2). The federal rules prohibit you from making any further 
disclosure of this information unless further disclosure is expressly permitted 
by the written consent of the person to whom it pertains or as otherwise 
permitted by 42 CFR part 2. A general authorization for the release of medical 
or other information is NOT sufficient for this purpose. The Federal rules 
restrict any use of the information to criminally investigate or prosecute any 
alcohol or drug abuse patient.The records that you are about to access may 
contain highly sensitive health information, the redisclosure of which is 
protected by Article 27-F of the [facility] Public Health law. If you 
continue you may have access to information: Regarding HIV / AIDS; Provided by 
facilities licensed or operated by the [facility] Office of Mental Health; 
or Provided by the [facility] Office for People With Developmental 
Disabilities. If such information is present, then the following [place] 
mandated warning applies: This information has been disclosed to you from 
confidential records which are protected by state law. State law prohibits you 
from making any further disclosure of this information without the specific 
written consent of the person to whom it pertains, or as otherwise permitted by 
law. Any unauthorized further disclosure in violation of state law may result in
a fine or care home sentence or both. A general authorization for the release of 
medical or other information is NOT sufficient authorization for further disc
losure.                                                                         
    



Encounters

          



           Encounter  Providers  Location   Date       Indications Data Source(s

)

 

           Outpatient Attender: VANESSA KIMBLE         2020 07:51:46 P

M EDT            [facility]



                                                                    



Insurance Providers

          



             Payer name   Policy type / Coverage type Policy ID    Covered party

 ID Covered 

party's relationship to yan Policy Yan             Plan Information

 

          [facility] COMMUNITY PLAN [facility]           [id number]           SP           

       [id number]

 

          [facility] COMMUNITY PLAN [facility]           [id number]           SP           

       [id number]

 

          [employer]              [id number]            SP                  [id number]

 

          [facility]           [id number]           SP                  [id number]

[id number]

 

          GURU             [id number]           SP                  [id number]

300

 

          MEDICAID            [id number]            SP                  [id number]

 

          SELF PAY ONLY           [id number]           SP                  [id number]

540

 

          MEDICAID  M         [id number]            S                   [id number]

 

          MEDICARE  C         [id number]           S                   [id number]

A

 

          SELF PAY ONLY           UNAVAILABLE                               UNAV

AILABLE

 

          [place] [place]         [id number]           S                   74

[id number]

 

          SELF PAY            UNAVAILABLE           SP                  UNAVAILA

BLE

 

          GURU             [id number]           SP                  [id number]

3

 

                              [id number]                                [id number]



                                                                                
                                                                                
                                                        



Results

          



                    ID                  Date                Data Source

 

                    [id number]             2020 03:30:00 PM EST [facility]









          Name      Value     Range     Interpretation Code Description Data Katt

rce(s) Supporting 

Document(s)

 

          SARS-CoV-2 (COVID 19)                                         [facility]  

   

 

                                        This lab was ordered by [facility] a

nd reported by [facility].











                                        Procedure

## 2021-02-16 NOTE — CCD
Summarization Of Episode

                             Created on: 2021



PEPE CARROLL

External Reference #: [id number]

: 1975

Sex: Male



Demographics





                          Address                   [address]

[place] [address]

 

                          Home Phone                [phone]

 

                          Preferred Language        English

 

                          Marital Status            Single

 

                          Amish Affiliation     NONE

 

                          Race                      White

 

                          Ethnic Group              Not  or 





Author





                          Author                    [facility]

 

                          Organization              [facility]

 

                          Address                   Unknown

 

                          Phone                     Unavailable







Support





                Name            Relationship    Address         Phone

 

                    VALUE HOMES         Next Of Kin         [address]

[place] [address]                    [phone]

 

                    CRYSTAL RESTAURANT  [place]  [address]                    [place]

 

                    Stella Abernathy Next Of Kin         [address] [place]  [address]                    [phone]

 

                    Rajni ABRAMS  Natalie    Next Of Kin         [address], [place]  [id number]                [phone]

 

                    CURRENT [employer]PLICATIONS Next Of Kin         [address] [place] [address]                    [phone]

 

                    CINDY  LINNLIA   Next Of Kin         [address] [place] [address]                    [phone]

 

                    DUNKINARSE          Next Of Kin         [address] [place] [address]                    [phone]

 

                UE              Next Of Kin     Unknown         Unavailable

 

                    YVES RODRIGUEZ       Next Of Kin         [address] 

[place] [address]                    [phone]







Care Team Providers





                    Care Team Member Name Role                Phone

 

                    Kaylie Personniraj MENDEZ FNP Unavailable         Unavailable



                                  



Re-disclosure Warning

          The records that you are about to access may contain information from 
federally-assisted alcohol or drug abuse programs. If such information is 
present, then the following federally mandated warning applies: This information
has been disclosed to you from records protected by federal confidentiality 
rules (42 CFR part 2). The federal rules prohibit you from making any further 
disclosure of this information unless further disclosure is expressly permitted 
by the written consent of the person to whom it pertains or as otherwise 
permitted by 42 CFR part 2. A general authorization for the release of medical 
or other information is NOT sufficient for this purpose. The Federal rules 
restrict any use of the information to criminally investigate or prosecute any 
alcohol or drug abuse patient.The records that you are about to access may 
contain highly sensitive health information, the redisclosure of which is 
protected by Article 27-F of the [facility] Public Health law. If you 
continue you may have access to information: Regarding HIV / AIDS; Provided by 
facilities licensed or operated by the [facility] Office of Mental Health; 
or Provided by the [facility] Office for People With Developmental 
Disabilities. If such information is present, then the following [place] 
mandated warning applies: This information has been disclosed to you from 
confidential records which are protected by state law. State law prohibits you 
from making any further disclosure of this information without the specific 
written consent of the person to whom it pertains, or as otherwise permitted by 
law. Any unauthorized further disclosure in violation of state law may result in
a fine or [facility]CC sentence or both. A general authorization for the release of 
medical or other information is NOT sufficient authorization for further disc
losure.                                                                         
    



Encounters

          



           Encounter  Providers  Location   Date       Indications Data Source(s

)

 

           Outpatient Attender: VAENSSA KIMBLE         2020 07:51:46 P

M EDT            [facility]



                                                                    



Insurance Providers

          



             Payer name   Policy type / Coverage type Policy ID    Covered party

 ID Covered 

party's relationship to yan Policy Yan             Plan Information

 

          [employer]EDNY              [id number]            SP                  [id number]

 

          [facility] COMMUNITY PLAN MCDO           [id number]           SP           

       [id number]

 

          [place]           [id number]           SP                  [id number]

[id number]

 

          GURU             [id number]           SP                  [id number]

300

 

          MEDICAID            [id number]            SP                  [id number]

 

          SELF PAY ONLY           [id number]           SP                  [id number]

540

 

          MEDICAID  M         [id number]            S                   [id number]

 

          MEDICARE  C         [id number]           S                   [id number]

A

 

          SELF PAY ONLY           UNAVAILABLE                               UNAV

AILABLE

 

          [place] [place] O         [id number]           S                   [id number]

[id number]

 

          SELF PAY            UNAVAILABLE           SP                  UNAVAILA

BLE

 

          GURU             [id number]           SP                  [id number]

3

 

                              [id number]                                [id number]



                                                                                
                                                                                
                                              



Results

          



                    ID                  Date                Data Source

 

                    [id number]             2020 03:30:00 PM EST [facility]









          Name      Value     Range     Interpretation Code Description Data Katt

rce(s) Supporting 

Document(s)

 

          SARS-CoV-2 (COVID 19)                                         [facility]  

   

 

                                        This lab was ordered by [facility] a

nd reported by [facility].











                                        Procedure

## 2021-02-16 NOTE — CCD
Summarization Of Episode

                             Created on: 2021



PEPE CARROLL

External Reference #: [id number]

: 1975

Sex: Male



Demographics





                          Address                   [address]

[place]  [address]

 

                          Home Phone                [phone]

 

                          Preferred Language        English

 

                          Marital Status            Single

 

                          Yarsani Affiliation     NONE

 

                          Race                      White

 

                          Ethnic Group              Not  or 





Author





                          Author                    [facility]

 

                          Organization              [facility]

 

                          Address                   Unknown

 

                          Phone                     Unavailable







Support





                Name            Relationship    Address         Phone

 

                    ON SITE SOLAR       Next Of Kin         [address] [place], [place]  [id number] [phone]

 

                    ON SIGHT SOLAR      Next Of [place]         [place]

[place]  [address]                     [phone]

 

                    [employer]ES         Next Of Kin         [address]

[place]  [address]                    [phone]

 

                    [employer]  [place]  [address]                    [place]

 

                    Stella Abernathy Next Of Kin         [address] [place]  [address]                    [phone]

 

                    Natalie Almonte    Next Of Kin         [address] [place], [place]  [id number]                [phone]

 

                    CURRENT APPLICATIONS Next Of Kin         [address] [place]  [address]                    [phone]

 

                    CINDYFERNANDO MCBRIDE   Next Of Kin         [address] [place]  [address]                    [phone]

 

                    DUNKINARSE          Next Of Kin         [address] [place]  [address]                    [phone]

 

                UE              Next Of Kin     Unknown         Unavailable

 

                    YVES RODRIGUEZ       Next Of Kin         [address] 

[place]  [address]                    [phone]







Care Team Providers





                    Care Team Member Name Role                Phone

 

                    Stella Person FNP Unavailable         Unavailable



                                  



Re-disclosure Warning

          The records that you are about to access may contain information from 
federally-assisted alcohol or drug abuse programs. If such information is 
present, then the following federally mandated warning applies: This information
has been disclosed to you from records protected by federal confidentiality 
rules (42 CFR part 2). The federal rules prohibit you from making any further 
disclosure of this information unless further disclosure is expressly permitted 
by the written consent of the person to whom it pertains or as otherwise 
permitted by 42 CFR part 2. A general authorization for the release of medical 
or other information is NOT sufficient for this purpose. The Federal rules 
restrict any use of the information to criminally investigate or prosecute any 
alcohol or drug abuse patient.The records that you are about to access may 
contain highly sensitive health information, the redisclosure of which is 
protected by Article 27-F of the [facility] Public Health law. If you 
continue you may have access to information: Regarding HIV / AIDS; Provided by 
facilities licensed or operated by the [facility] Office of Mental Health; 
or Provided by the [facility] Office for People With Developmental 
Disabilities. If such information is present, then the following [place] 
mandated warning applies: This information has been disclosed to you from 
confidential records which are protected by state law. State law prohibits you 
from making any further disclosure of this information without the specific 
written consent of the person to whom it pertains, or as otherwise permitted by 
law. Any unauthorized further disclosure in violation of state law may result in
a fine or [facility] sentence or both. A general authorization for the release of 
medical or other information is NOT sufficient authorization for further disc
losure.                                                                         
    



Encounters

          



           Encounter  Providers  Location   Date       Indications Data Source(s

)

 

           Outpatient Attender: VANESSA KIMBLE         2020 07:51:46 P

M EDT            [facility]



                                                                    



Insurance Providers

          



             Payer name   Policy type / Coverage type Policy ID    Covered party

 ID Covered 

party's relationship to yan Policy Yan             Plan Information

 

          [facility] [employer] [facility]           [id number]           SP           

       [id number]

 

          [id number]              [id number]            SP                  [id number]

 

          [place]           [id number]           SP                  [id number]

[id number]

 

          [facility]             [id number]           SP                  [id number]

[id number]

 

          MEDICAID            [id number]            SP                  [id number]

 

          SELF PAY ONLY           [id number]           SP                  [id number]

[id number]

 

          MEDICAID  M         [id number]            S                   [id number]

 

          MEDICARE  C         [id number]           S                   [id number]

A

 

          SELF PAY ONLY           UNAVAILABLE                               UNAV

AILABLE

 

          [place] O         [id number]           S                   [id number]

[id number]

 

          SELF PAY            UNAVAILABLE           SP                  UNAVAILA

BLE

 

          GURU             [id number]           SP                  [id number]

[id number]

 

                              [id number]                                [id number]



                                                                                
                                                                                
                                              



Results

          



                    ID                  Date                Data Source

 

                    [id number]             2020 03:30:00 PM EST [facility]









          Name      Value     Range     Interpretation Code Description Data Katt

rce(s) Supporting 

Document(s)

 

          SARS-CoV-2 (COVID 19)                                         [facility]  

   

 

                                        This lab was ordered by [facility] a

nd reported by [facility].











                                        Procedure

## 2021-02-17 VITALS — DIASTOLIC BLOOD PRESSURE: 74 MMHG | SYSTOLIC BLOOD PRESSURE: 137 MMHG

## 2021-02-17 VITALS — DIASTOLIC BLOOD PRESSURE: 84 MMHG | SYSTOLIC BLOOD PRESSURE: 124 MMHG

## 2021-02-17 NOTE — HPEPDOC
[facility] Medical History & Physical


Date of Admission


Feb 16, 2021


Date of Service:  Feb 17, 2021





History and Physical


CHIEF COMPLAINT:  routine medical exam 





HISTORY OF PRESENT ILLNESS: 





46 y/o M w pmh significant for factor V leiden mutation, anxiety, depression, 

suicide ideation, cervical DDD, substance abuse admitted to [facility] for depression,

now being evaluated fro a routine medical examination.Pt refused ROS. "I'm 

alright. Nothing is bothering me. Leave me alone."





PAST MEDICAL HISTORY:


factor V leiden mutation, anxiety, depression, suicide ideation, cervical DDD, 

substance abuse 





PAST SURGICAL HISTORY:


left eye foreign body





SOCIAL HISTORY:


single, 2 children, unemployed 1ppd smoking, h/o methamphetamine, marijuana use 

s/p rehab at [facility]. 





FAMILY HISTORY:


Father: alive and well


Mother:alive and well





ALLERGIES: Please see below.





REVIEW OF SYSTEMS:


refused 





HOME MEDICATIONS: Please see below. 





PHYSICAL EXAMINATION:


VITAL SIGNS: see below


refused exam








LABORATORY DATA: See below.





MICROBIOLOGY: Please see below. 





ASSESSMENT: 46 y/o M w pmh significant for factor V deficiency, anxiety, 

depression, suicide ideation, cervical DDD, substance abuse admitted to [facility], 

now being evaluated fro a routine medical examination.





Factor V leiden mutation : denies prior history of dvt/pe. no c/o. 





anxiety/depression: managed by psychiatrist





substance abuse: counselling provided





Cervical DDD: PRN pain meds





Hospitalist signing off . pls. call [facility] for a new consult if new acute

medical issues arise.





Vital Signs





Vital Signs








  Date Time  Temp Pulse Resp B/P (MAP) Pulse Ox O2 Delivery O2 Flow Rate FiO2


 


2/17/21 06:13 98.1 55 20 137/74 (95)    


 


2/16/21 17:44      Room Air  


 


2/16/21 13:03     99   











Home Medications


No Active Prescriptions or Reported Meds





Allergies


Coded Allergies:  


     quetiapine (Verified  Allergy, Intermediate, insomnia, 12/13/19)


     trazodone (Verified  Allergy, Intermediate, insomnia and restless leg, 

12/13/19)


     Penicillins (Verified  Allergy, Unknown, 12/13/19)





A-FIB/CHADSVASC


A-FIB History


Current/History of A-Fib/PAF?:  No


Current PO Anticoag Therapy:  No





Age/Risk Factor Scoring


CHADSVASC:  








CHADSVASC Response (Comments) Value


 


Age Risk Factor Age < 65 years old 0


 


Gender Risk Factor Male 0


 


Hx of CHF No 0


 


Hx of HTN No 0


 


Hx of Stroke/TIA/or VTE No 0


 


Hx of Diabetes No 0


 


Hx of Vascular Disease No 0


 


Total  0











Treatment


Treatment ordered:  NONE











SHIRA CASTILLO MD            Feb 17, 2021 12:11
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
## 2021-02-18 VITALS — SYSTOLIC BLOOD PRESSURE: 140 MMHG | DIASTOLIC BLOOD PRESSURE: 86 MMHG

## 2021-02-18 VITALS — DIASTOLIC BLOOD PRESSURE: 66 MMHG | SYSTOLIC BLOOD PRESSURE: 130 MMHG

## 2021-02-18 NOTE — MHIPNPDOC
[facility] Progress Note


Progress Note


DATE OF SERVICE: 2/18/21





HISTORY: 45-year-old male with a history of depression and substance abuse.





VITAL SIGNS: See below.





NEW TEST RESULTS:. Toxicology positive for marijuana and alcohol.





CURRENT MEDICATIONS: See below.





MENTAL STATUS EXAMINATION:


Patient is a 45-year-old male-year old male,  who is complaining of chronic 

thoughts of wanting to die.


Speech: Is quiet.


Language skills are, normal.


Thought processes including: Focused on wanting to die.


Thought content: Sad. Abstract reasoning, and computation: Able to abstract. 

Description of associations: Loose associations.


Description of abnormal or psychotic thoughts:, No psychotic thoughts.


Judgment: Poor


Insight:, Poor.


Orientation: 3.


Recent and remote memory: Intact.


Attention span and concentration: Fair.


Language:. No difficulties.


Fund of knowledge: Full.


Mood: Sad. Affect: Downcast.





DIAGNOSES:


1. Major depressive illness, recurrent.


2.. Dysthymic personality.


3., Substance abuse.


 


ASSESSMENT: 45-year-old male,  with 13-year-old daughter and possible 

new baby by another woman with poor self-esteem a job failure in general self 

loathing





MANAGEMENT PLAN: At this point, patient does not want a medication. He was able 

to discuss deep interest in music and guitars. I am hoping he will be more 

amenable to medication which might include Cymbalta, which Dr. Matthews used on his

last visit.





TIME SPENT:, 35 minutes.





Vital Signs





Vital Signs








  Date Time  Temp Pulse Resp B/P (MAP) Pulse Ox O2 Delivery O2 Flow Rate FiO2


 


2/18/21 06:33 98.4 52 18 130/66 (87) 98 Room Air  











Current Medications





Current Medications








 Medications


  (Trade)  Dose


 Ordered  Sig/Tc


 Route


 PRN Reason  Start Time


 Stop Time Status Last Admin


Dose Admin


 


 Acetaminophen


  (Tylenol Tab)  650 mg  Q6HP  PRN


 PO


 HEADACHE or DISCOMFORT  2/16/21 14:30


     





 


 Al Hydrox/Mg


 Hydrox/Simethicone


  (Mylanta)  30 ml  Q4HP  PRN


 PO


 HEARTBURN/INDIGESTION  2/16/21 14:30


     





 


 Home Med


  (Med Rec


 Complete!)    ASDIRECTED


 XX


   2/16/21 12:00


 2/16/21 11:56 DC  





 


 Magnesium


 Hydroxide


  (Milk Of


 Magnesia)  30 ml  DAILYPRN  PRN


 PO


 CONSTIPATION  2/16/21 14:30


     





 


 Olanzapine


  (ZyPREXA


 **ZYDIS**)  5 mg  Q6HP  PRN


 PO


 AGITATION  2/16/21 14:30


     














Allergies


Coded Allergies:  


     quetiapine (Verified  Allergy, Intermediate, insomnia, 12/13/19)


     trazodone (Verified  Allergy, Intermediate, insomnia and restless leg, 

12/13/19)


     Penicillins (Verified  Allergy, Unknown, 12/13/19)











MARLEN BUENO MD            Feb 18, 2021 13:36

## 2021-02-19 VITALS — DIASTOLIC BLOOD PRESSURE: 78 MMHG | SYSTOLIC BLOOD PRESSURE: 129 MMHG

## 2021-02-19 VITALS — SYSTOLIC BLOOD PRESSURE: 129 MMHG | DIASTOLIC BLOOD PRESSURE: 76 MMHG

## 2021-02-19 RX ADMIN — VENLAFAXINE HYDROCHLORIDE SCH MG: 75 CAPSULE, EXTENDED RELEASE ORAL at 08:38

## 2021-02-19 RX ADMIN — OLANZAPINE PRN MG: 5 TABLET, ORALLY DISINTEGRATING ORAL at 20:45

## 2021-02-19 NOTE — MHIPNPDOC
[facility] Progress Note


Progress Note


DATE OF SERVICE: 2/19/21





HISTORY: Victor M is a 45-year-old male, , who was had repetitive 

admissions for severe depression. Today his mood seems better. He was placed on 

Effexo this morning and was given Vistaril for anxiety during the day. Today, he

discussed guitars, music and says he is trying to suppress any depressive 

thoughts





VITAL SIGNS: See below.





NEW TEST RESULTS: None.





CURRENT MEDICATIONS: See below.





MENTAL STATUS EXAMINATION:


Patient is a 45-year old male, who is, admitted for severe depression.


Speech: Is improved and normal. Today.


Language skills are, intact.


Thought processes including: Suicidal thoughts and wanting to kill self on 

admission.


Thought content: Today suppressing any negative thoughts. Abstract reasoning, 

and computation: Able to abstract and compute. Description of associations: 

Loose associations.


Description of abnormal or psychotic thoughts:. No psychotic thought. Today.


Judgment:, Fair.


Insight: Poor.


Orientation: 3.


Recent and remote memory:. No disturbance.


Attention span and concentration: Needs to be evaluated.


Language:. No disturbance.


Fund of knowledge: Full.


Mood: Neutral. Affect: bright.





DIAGNOSES:


1. Major depression, recurrent.


2.. Rule out substance abuse.


3., Numerous family stressors.


 


ASSESSMENT:. Patient seems improved today from admission, and his previous adm

ission under the care of Dr. De Oliveira also showed improvement, but patient was in 

significant depression. With much suicidal thought. On admission. So the 

question will be what brought about the recurrence and if patient can be brought

to get decent follow-up





MANAGEMENT PLAN: As above. Will titrate Effexor.





TIME SPENT: 35 minutes.





Vital Signs





Vital Signs








  Date Time  Temp Pulse Resp B/P (MAP) Pulse Ox O2 Delivery O2 Flow Rate FiO2


 


2/19/21 06:22 98.2 59 16 129/78 (95) 100 Room Air  











Current Medications





Current Medications








 Medications


  (Trade)  Dose


 Ordered  Sig/Tc


 Route


 PRN Reason  Start Time


 Stop Time Status Last Admin


Dose Admin


 


 Acetaminophen


  (Tylenol Tab)  650 mg  Q6HP  PRN


 PO


 HEADACHE or DISCOMFORT  2/16/21 14:30


     





 


 Al Hydrox/Mg


 Hydrox/Simethicone


  (Mylanta)  30 ml  Q4HP  PRN


 PO


 HEARTBURN/INDIGESTION  2/16/21 14:30


     





 


 Diphenhydramine


 HCl


  (Benadryl)  50 mg  QHSP  PRN


 PO


 INSOMNIA  2/18/21 15:00


     





 


 Home Med


  (Med Rec


 Complete!)    ASDIRECTED


 XX


   2/16/21 12:00


 2/16/21 11:56 DC  





 


 Hydroxyzine HCl


  (Atarax)  25 mg  TID


 PO


   2/18/21 16:00


    2/19/21 08:38





 


 Ibuprofen


  (Advil)  200 mg  Q4HP  PRN


 PO


 PAIN  2/18/21 14:45


 2/18/21 15:03 DC  





 


 Magnesium


 Hydroxide


  (Milk Of


 Magnesia)  30 ml  DAILYPRN  PRN


 PO


 CONSTIPATION  2/16/21 14:30


     





 


 Naproxen


  (Naprosyn)  500 mg  BIDP  PRN


 PO


 PAIN  2/18/21 14:45


    2/18/21 15:21





 


 Olanzapine


  (ZyPREXA


 **ZYDIS**)  5 mg  Q6HP  PRN


 PO


 AGITATION  2/16/21 14:30


     





 


 Venlafaxine HCl


  (Effexor **Xr**)  75 mg  DAILY


 PO


   2/19/21 09:00


    2/19/21 08:38














Allergies


Coded Allergies:  


     quetiapine (Verified  Allergy, Intermediate, insomnia, 12/13/19)


     trazodone (Verified  Allergy, Intermediate, insomnia and restless leg, 

12/13/19)


     Penicillins (Verified  Allergy, Unknown, 12/13/19)











MARLEN BUENO MD            Feb 19, 2021 14:18

## 2021-02-20 VITALS — SYSTOLIC BLOOD PRESSURE: 119 MMHG | DIASTOLIC BLOOD PRESSURE: 78 MMHG

## 2021-02-20 VITALS — DIASTOLIC BLOOD PRESSURE: 88 MMHG | SYSTOLIC BLOOD PRESSURE: 136 MMHG

## 2021-02-20 RX ADMIN — VENLAFAXINE HYDROCHLORIDE SCH MG: 75 CAPSULE, EXTENDED RELEASE ORAL at 08:49

## 2021-02-20 RX ADMIN — OLANZAPINE PRN MG: 5 TABLET, ORALLY DISINTEGRATING ORAL at 20:34

## 2021-02-20 NOTE — MHIPNPDOC
[facility] Progress Note


Progress Note


DATE OF SERVICE: 2/20/21





HISTORY: Mr. Andrew discussed more in detail about his life. Apparently, there've

been many episodes of homelessness, although he did have a girlfriend. He had a 

home with once, which burned and he moved with a girl to [place] and they had a 

home for a while. Other descriptions by him include many episodes of 

homelessness and presently now he is "staying with friends". He has frequently 

lived out of backpacks and statement tense and homeless areas. He states she's 

had a hard time keeping a job takes when he has a job. His depression and a

nxiety kick in and he screws up or "gets fired. In addition, at times he just 

doesn't show up at work and he states she's had good jobs. Patient describes 

although without norm is detail that he used to drink a lot. He still is 

drinking even though he's been to AA and "all that BS". He also smokes dope. He 

states in order to develop the courage to come here this visit, he drank..





VITAL SIGNS: See below.





NEW TEST RESULTS: None.





CURRENT MEDICATIONS: See below.





MENTAL STATUS EXAMINATION:


Patient is a 45-year old male, who is, suffers from episodic depression with 

numerous episodes of homelessness, alcohol and marijuana use. Patient also at 

times sold drugs.


Speech: Is. Normal.


Language skills are normal.


Thought processes including: Description of his life's failures.


Thought content: As above. Abstract reasoning, and computation: Able to 

abstract. Description of associations:. No loose associations.


Description of abnormal or psychotic thoughts:. No psychotic thought.


Judgment: Poor


Insight: Fair.


Orientation: 3.


Recent and remote memory: Intact.


Attention span and concentration: Intact.


Language:. No abnormalities.


Fund of knowledge:. Full.


Mood: Euthymic. Affect:, Congruent.





DIAGNOSES:


1., Depression, anxiety.


2., Alcohol and drug abuse.


3. None.


 


ASSESSMENT:. Patient states she is calmer with use of Vistaril, but long history

of homelessness and alcoholism seemed to be poorly prognostic for this patient's

global improvement





MANAGEMENT PLAN:. Continue therapy. Continue medication. We will discuss housing

issues with discharge planners.





TIME SPENT: 35 minutes.





Vital Signs





Vital Signs








  Date Time  Temp Pulse Resp B/P (MAP) Pulse Ox O2 Delivery O2 Flow Rate FiO2


 


2/20/21 11:09      Room Air  


 


2/20/21 07:03 97.3 51 16 119/78 (92) 97   











Current Medications





Current Medications








 Medications


  (Trade)  Dose


 Ordered  Sig/Tc


 Route


 PRN Reason  Start Time


 Stop Time Status Last Admin


Dose Admin


 


 Acetaminophen


  (Tylenol Tab)  650 mg  Q6HP  PRN


 PO


 HEADACHE or DISCOMFORT  2/16/21 14:30


     





 


 Al Hydrox/Mg


 Hydrox/Simethicone


  (Mylanta)  30 ml  Q4HP  PRN


 PO


 HEARTBURN/INDIGESTION  2/16/21 14:30


     





 


 Diphenhydramine


 HCl


  (Benadryl)  50 mg  QHSP  PRN


 PO


 INSOMNIA  2/18/21 15:00


     





 


 Home Med


  (Med Rec


 Complete!)    ASDIRECTED


 XX


   2/16/21 12:00


 2/16/21 11:56 DC  





 


 Hydroxyzine HCl


  (Atarax)  25 mg  TID


 PO


   2/18/21 16:00


    2/20/21 08:49





 


 Ibuprofen


  (Advil)  200 mg  Q4HP  PRN


 PO


 PAIN  2/18/21 14:45


 2/18/21 15:03 DC  





 


 Magnesium


 Hydroxide


  (Milk Of


 Magnesia)  30 ml  DAILYPRN  PRN


 PO


 CONSTIPATION  2/16/21 14:30


     





 


 Naproxen


  (Naprosyn)  500 mg  BIDP  PRN


 PO


 PAIN  2/18/21 14:45


    2/18/21 15:21





 


 Olanzapine


  (ZyPREXA


 **ZYDIS**)  5 mg  Q6HP  PRN


 PO


 AGITATION  2/16/21 14:30


    2/19/21 20:45





 


 Venlafaxine HCl


  (Effexor **Xr**)  75 mg  DAILY


 PO


   2/19/21 09:00


 2/20/21 15:10 DC 2/20/21 08:49





 


 Venlafaxine HCl


  (Effexor **Xr**)  150 mg  DAILY


 PO


   2/21/21 09:00


     














Allergies


Coded Allergies:  


     quetiapine (Verified  Allergy, Intermediate, insomnia, 12/13/19)


     trazodone (Verified  Allergy, Intermediate, insomnia and restless leg, 

12/13/19)


     Penicillins (Verified  Allergy, Unknown, 12/13/19)











MARLEN BUENO MD            Feb 20, 2021 15:29

## 2021-02-21 VITALS — SYSTOLIC BLOOD PRESSURE: 136 MMHG | DIASTOLIC BLOOD PRESSURE: 71 MMHG

## 2021-02-21 VITALS — SYSTOLIC BLOOD PRESSURE: 139 MMHG | DIASTOLIC BLOOD PRESSURE: 90 MMHG

## 2021-02-21 RX ADMIN — VENLAFAXINE HYDROCHLORIDE SCH MG: 75 CAPSULE, EXTENDED RELEASE ORAL at 08:54

## 2021-02-22 VITALS — SYSTOLIC BLOOD PRESSURE: 132 MMHG | DIASTOLIC BLOOD PRESSURE: 79 MMHG

## 2021-02-22 VITALS — SYSTOLIC BLOOD PRESSURE: 135 MMHG | DIASTOLIC BLOOD PRESSURE: 81 MMHG

## 2021-02-22 RX ADMIN — VENLAFAXINE HYDROCHLORIDE SCH MG: 75 CAPSULE, EXTENDED RELEASE ORAL at 08:14

## 2021-02-22 RX ADMIN — OLANZAPINE PRN MG: 5 TABLET, ORALLY DISINTEGRATING ORAL at 20:19

## 2021-02-22 NOTE — MHIPNPDOC
[facility] Progress Note


Progress Note


DATE OF SERVICE: 2/22/21





HISTORY: 45-year-old man with some history of substance abuse, homelessness, job

loss, relationship loss and general hopelessness.





VITAL SIGNS: See below.





NEW TEST RESULTS: None.





CURRENT MEDICATIONS: See below.





MENTAL STATUS EXAMINATION:


Patient is a 45-year old male, who is willing to live in DSS apartment or hotel 

and arranging follow-up appointments. He is presently on Effexor and Vistaril 

which he claims are effective.


Speech: Is. Normal.


Language skills are intact.


Thought processes including: Intact. Patient is bored and wishing for discharge.


Thought content:. Focused on discharge. Abstract reasoning, and computation: 

Able to abstract. Description of associations: Loose associations.


Description of abnormal or psychotic thoughts:. No psychotic thought noted.


Judgment:. Poor.


Insight: Good.


Orientation: 3.


Recent and remote memory: Intact.


Attention span and concentration: Adequate.


Language:. No disturbance.


Fund of knowledge:. Full.


Mood: Euthymic. Affect: Irritable.





DIAGNOSES:


1., Dysthymic disorder, rule out major depression.


2. Inadequate personality traits.


3.. Substance abuse.


 


ASSESSMENT: 45-year-old male who is generally in a homeless and seems to be 

unable to hold a job or a permanent living situation





MANAGEMENT PLAN: Will be discharged to [facility] with follow-up plan arranged by 

discharge planner.





TIME SPENT: 35 minutes.





Vital Signs





Vital Signs








  Date Time  Temp Pulse Resp B/P (MAP) Pulse Ox O2 Delivery O2 Flow Rate FiO2


 


2/22/21 06:56 97.6 55 14 132/79 (96) 97 Room Air  











Current Medications





Current Medications








 Medications


  (Trade)  Dose


 Ordered  Sig/Tc


 Route


 PRN Reason  Start Time


 Stop Time Status Last Admin


Dose Admin


 


 Acetaminophen


  (Tylenol Tab)  650 mg  Q6HP  PRN


 PO


 HEADACHE or DISCOMFORT  2/16/21 14:30


     





 


 Al Hydrox/Mg


 Hydrox/Simethicone


  (Mylanta)  30 ml  Q4HP  PRN


 PO


 HEARTBURN/INDIGESTION  2/16/21 14:30


     





 


 Diphenhydramine


 HCl


  (Benadryl)  50 mg  QHSP  PRN


 PO


 INSOMNIA  2/18/21 15:00


    2/20/21 20:34





 


 Home Med


  (Med Rec


 Complete!)    ASDIRECTED


 XX


   2/16/21 12:00


 2/16/21 11:56 DC  





 


 Hydroxyzine HCl


  (Atarax)  25 mg  TID


 PO


   2/18/21 16:00


    2/22/21 08:14





 


 Ibuprofen


  (Advil)  200 mg  Q4HP  PRN


 PO


 PAIN  2/18/21 14:45


 2/18/21 15:03 DC  





 


 Magnesium


 Hydroxide


  (Milk Of


 Magnesia)  30 ml  DAILYPRN  PRN


 PO


 CONSTIPATION  2/16/21 14:30


     





 


 Naproxen


  (Naprosyn)  500 mg  BIDP  PRN


 PO


 PAIN  2/18/21 14:45


    2/18/21 15:21





 


 Olanzapine


  (ZyPREXA


 **ZYDIS**)  5 mg  Q6HP  PRN


 PO


 AGITATION  2/16/21 14:30


    2/20/21 20:34





 


 Venlafaxine HCl


  (Effexor **Xr**)  75 mg  DAILY


 PO


   2/19/21 09:00


 2/20/21 15:10 DC 2/20/21 08:49





 


 Venlafaxine HCl


  (Effexor **Xr**)  150 mg  DAILY


 PO


   2/21/21 09:00


    2/22/21 08:14














Allergies


Coded Allergies:  


     quetiapine (Verified  Allergy, Intermediate, insomnia, 12/13/19)


     trazodone (Verified  Allergy, Intermediate, insomnia and restless leg, 

12/13/19)


     Penicillins (Verified  Allergy, Unknown, 12/13/19)











MARLEN BUENO MD            Feb 22, 2021 13:48

## 2021-02-23 VITALS — DIASTOLIC BLOOD PRESSURE: 78 MMHG | SYSTOLIC BLOOD PRESSURE: 135 MMHG

## 2021-02-23 RX ADMIN — OLANZAPINE PRN MG: 5 TABLET, ORALLY DISINTEGRATING ORAL at 11:03

## 2021-02-23 RX ADMIN — VENLAFAXINE HYDROCHLORIDE SCH MG: 75 CAPSULE, EXTENDED RELEASE ORAL at 08:15

## 2021-02-23 NOTE — MHDSPDOC
[facility] Discharge Summary


Discharge Summary


DATE OF ADMISSION: Feb 16, 2021 at 14:28 


DATE OF DISCHARGE: Feb 23,2021





DISCHARGE DIAGNOSES:


1. Major depressive illness.


2., Substance abuse.





REASON FOR ADMISSION:, Depression with suicidal thought





CONSULTANTS INVOLVED: None





TREATMENT AND PROGRESS ON THE UNIT : Patient improved with Effexor as he 

improved with previous antidepressants. However, he lives without permanent 

housing. Addition he has no follow-up. Patient has numerous children from 

different women and is unsuccessful in keeping jobs or permanent housing.





HOSPITAL COURSE: As in his last admission, she improved in mood with the use of 

Effexor and Vistaril





DISCHARGE ASSESSMENT: Major depressive disorder with substance abuse and aspects

of inadequate personality





MENTAL STATUS EXAMINATION ON DISCHARGE: 


Patient is a 45-year old male, who is being discharged to [facility] with psychiatric 

follow-up.


Speech is normal.


Language skills are intact.


Thought processes including: Boredom and new patients to leave. 


Thought content: As above.


Abstract reasoning, and computation: Able to abstract.


Description of associations:. No loose associations.


Description of abnormal or psychotic thoughts: No psychotic thoughts.


Judgment: Poor


Insight: Poor


Orientation to 3.


Recent and remote memory: Intact.


Attention span and concentration: Intact.


Language:. No disturbance.


Fund of knowledge: Full.


Mood:, Euthymic.


Affect: Congruent.





MEDICATIONS ON DISCHARGE:


-Effexor were 150 mg for depression.


-, Vistaril for, anxiety.








PLAN/FOLLOWUP ARRANGEMENTS: [facility] and psychotherapy. Psychiatric follow-up as per 

discharge planner.





The amount of time spent in the coordination of care for this patient was 

approximately 35 minutes.





Vital Signs/I&Os





Vital Signs








  Date Time  Temp Pulse Resp B/P (MAP) Pulse Ox O2 Delivery O2 Flow Rate FiO2


 


2/22/21 18:14 97.8 85 16 135/81 (99)    


 


2/22/21 06:56     97 Room Air  











Medications


No Active Prescriptions or Reported Meds





Allergies


Coded Allergies:  


     quetiapine (Verified  Allergy, Intermediate, insomnia, 12/13/19)


     trazodone (Verified  Allergy, Intermediate, insomnia and restless leg, 1

2/13/19)


     Penicillins (Verified  Allergy, Unknown, 12/13/19)











MARLEN BUENO MD            Feb 23, 2021 07:16

## 2021-12-21 ENCOUNTER — HOSPITAL ENCOUNTER (INPATIENT)
Dept: HOSPITAL 53 - M ED | Age: 46
LOS: 6 days | Discharge: HOME | DRG: 754 | End: 2021-12-27
Attending: PSYCHIATRY & NEUROLOGY | Admitting: STUDENT IN AN ORGANIZED HEALTH CARE EDUCATION/TRAINING PROGRAM
Payer: MEDICAID

## 2021-12-21 VITALS — HEIGHT: 74 IN

## 2021-12-21 DIAGNOSIS — Z88.8: ICD-10-CM

## 2021-12-21 DIAGNOSIS — F17.210: ICD-10-CM

## 2021-12-21 DIAGNOSIS — Z91.51: ICD-10-CM

## 2021-12-21 DIAGNOSIS — M50.90: ICD-10-CM

## 2021-12-21 DIAGNOSIS — Z59.1: ICD-10-CM

## 2021-12-21 DIAGNOSIS — F41.9: ICD-10-CM

## 2021-12-21 DIAGNOSIS — D68.2: ICD-10-CM

## 2021-12-21 DIAGNOSIS — F12.10: ICD-10-CM

## 2021-12-21 DIAGNOSIS — F32.9: Primary | ICD-10-CM

## 2021-12-21 DIAGNOSIS — Z56.0: ICD-10-CM

## 2021-12-21 DIAGNOSIS — Z62.810: ICD-10-CM

## 2021-12-21 DIAGNOSIS — U07.1: ICD-10-CM

## 2021-12-21 DIAGNOSIS — R45.851: ICD-10-CM

## 2021-12-21 DIAGNOSIS — Z88.0: ICD-10-CM

## 2021-12-21 DIAGNOSIS — Z78.1: ICD-10-CM

## 2021-12-21 DIAGNOSIS — F60.9: ICD-10-CM

## 2021-12-21 DIAGNOSIS — Z63.8: ICD-10-CM

## 2021-12-21 LAB
ALBUMIN SERPL BCG-MCNC: 3.9 GM/DL (ref 3.2–5.2)
ALT SERPL W P-5'-P-CCNC: 24 U/L (ref 12–78)
AMPHETAMINES UR QL SCN: NEGATIVE
APAP SERPL-MCNC: < 2 UG/ML (ref 10–30)
BARBITURATES UR QL SCN: NEGATIVE
BENZODIAZ UR QL SCN: NEGATIVE
BILIRUB CONJ SERPL-MCNC: 0.1 MG/DL (ref 0–0.2)
BILIRUB SERPL-MCNC: 0.4 MG/DL (ref 0.2–1)
BUN SERPL-MCNC: 14 MG/DL (ref 7–18)
BZE UR QL SCN: NEGATIVE
CALCIUM SERPL-MCNC: 9.1 MG/DL (ref 8.5–10.1)
CANNABINOIDS UR QL SCN: POSITIVE
CHLORIDE SERPL-SCNC: 110 MEQ/L (ref 98–107)
CO2 SERPL-SCNC: 29 MEQ/L (ref 21–32)
CREAT SERPL-MCNC: 1.05 MG/DL (ref 0.7–1.3)
ETHANOL SERPL-MCNC: < 0.003 % (ref 0–0.01)
GFR SERPL CREATININE-BSD FRML MDRD: > 60 ML/MIN/{1.73_M2} (ref 60–?)
GLUCOSE SERPL-MCNC: 109 MG/DL (ref 70–100)
HCT VFR BLD AUTO: 43.8 % (ref 42–52)
HGB BLD-MCNC: 14.6 G/DL (ref 13.5–17.5)
MCH RBC QN AUTO: 29.8 PG (ref 27–33)
MCHC RBC AUTO-ENTMCNC: 33.3 G/DL (ref 32–36.5)
MCV RBC AUTO: 89.4 FL (ref 80–96)
METHADONE UR QL SCN: NEGATIVE
OPIATES UR QL SCN: NEGATIVE
PCP UR QL SCN: NEGATIVE
PLATELET # BLD AUTO: 438 10^3/UL (ref 150–450)
POTASSIUM SERPL-SCNC: 4.6 MEQ/L (ref 3.5–5.1)
PROT SERPL-MCNC: 7.1 GM/DL (ref 6.4–8.2)
RBC # BLD AUTO: 4.9 10^6/UL (ref 4.3–6.1)
RSV RNA NPH QL NAA+PROBE: NEGATIVE
SALICYLATES SERPL-MCNC: 2.9 MG/DL (ref 5–30)
SODIUM SERPL-SCNC: 142 MEQ/L (ref 136–145)
TSH SERPL DL<=0.005 MIU/L-ACNC: 0.66 UIU/ML (ref 0.36–3.74)
WBC # BLD AUTO: 9 10^3/UL (ref 4–10)

## 2021-12-21 SDOH — SOCIAL STABILITY - SOCIAL INSECURITY: OTHER SPECIFIED PROBLEMS RELATED TO PRIMARY SUPPORT GROUP: Z63.8

## 2021-12-21 SDOH — ECONOMIC STABILITY - INCOME SECURITY: UNEMPLOYMENT, UNSPECIFIED: Z56.0

## 2021-12-21 SDOH — ECONOMIC STABILITY - HOUSING INSECURITY: INADEQUATE HOUSING: Z59.1

## 2021-12-22 VITALS — DIASTOLIC BLOOD PRESSURE: 68 MMHG | SYSTOLIC BLOOD PRESSURE: 120 MMHG

## 2021-12-24 RX ADMIN — VENLAFAXINE SCH MG: 25 TABLET ORAL at 09:00

## 2021-12-25 VITALS — DIASTOLIC BLOOD PRESSURE: 81 MMHG | SYSTOLIC BLOOD PRESSURE: 136 MMHG

## 2021-12-25 VITALS — SYSTOLIC BLOOD PRESSURE: 129 MMHG | DIASTOLIC BLOOD PRESSURE: 84 MMHG

## 2021-12-25 RX ADMIN — VENLAFAXINE SCH MG: 25 TABLET ORAL at 09:00

## 2021-12-26 VITALS — DIASTOLIC BLOOD PRESSURE: 80 MMHG | SYSTOLIC BLOOD PRESSURE: 142 MMHG

## 2021-12-26 VITALS — DIASTOLIC BLOOD PRESSURE: 68 MMHG | SYSTOLIC BLOOD PRESSURE: 135 MMHG

## 2021-12-26 RX ADMIN — VENLAFAXINE SCH MG: 25 TABLET ORAL at 09:00

## 2021-12-27 VITALS — DIASTOLIC BLOOD PRESSURE: 69 MMHG | SYSTOLIC BLOOD PRESSURE: 118 MMHG

## 2021-12-27 RX ADMIN — VENLAFAXINE SCH MG: 25 TABLET ORAL at 09:00

## 2022-08-25 ENCOUNTER — HOSPITAL ENCOUNTER (EMERGENCY)
Dept: HOSPITAL 53 - M ED | Age: 47
Discharge: LEFT BEFORE DISCHARGE | End: 2022-08-25
Payer: MEDICAID

## 2022-08-25 VITALS — SYSTOLIC BLOOD PRESSURE: 129 MMHG | DIASTOLIC BLOOD PRESSURE: 73 MMHG

## 2022-08-25 VITALS — BODY MASS INDEX: 23.99 KG/M2 | HEIGHT: 74 IN | WEIGHT: 186.95 LBS

## 2022-08-25 DIAGNOSIS — Z53.21: Primary | ICD-10-CM

## 2023-08-12 ENCOUNTER — HOSPITAL ENCOUNTER (EMERGENCY)
Dept: HOSPITAL 53 - M ED | Age: 48
Discharge: HOME | End: 2023-08-12
Payer: MEDICAID

## 2023-08-12 VITALS — OXYGEN SATURATION: 100 % | DIASTOLIC BLOOD PRESSURE: 81 MMHG | SYSTOLIC BLOOD PRESSURE: 125 MMHG | TEMPERATURE: 96.7 F

## 2023-08-12 VITALS — WEIGHT: 153.66 LBS | HEIGHT: 74 IN | BODY MASS INDEX: 19.72 KG/M2

## 2023-08-12 DIAGNOSIS — F32.A: ICD-10-CM

## 2023-08-12 DIAGNOSIS — Z88.0: ICD-10-CM

## 2023-08-12 DIAGNOSIS — F41.9: ICD-10-CM

## 2023-08-12 DIAGNOSIS — K04.7: Primary | ICD-10-CM

## 2023-08-12 DIAGNOSIS — M51.36: ICD-10-CM

## 2023-08-12 DIAGNOSIS — Z88.8: ICD-10-CM

## 2023-08-12 DIAGNOSIS — K03.81: ICD-10-CM

## 2023-08-12 PROCEDURE — 99283 EMERGENCY DEPT VISIT LOW MDM: CPT

## 2023-08-12 PROCEDURE — 96372 THER/PROPH/DIAG INJ SC/IM: CPT

## 2023-08-30 ENCOUNTER — HOSPITAL ENCOUNTER (INPATIENT)
Dept: HOSPITAL 53 - M ED | Age: 48
LOS: 8 days | Discharge: HOME | DRG: 751 | End: 2023-09-07
Admitting: STUDENT IN AN ORGANIZED HEALTH CARE EDUCATION/TRAINING PROGRAM
Payer: COMMERCIAL

## 2023-08-30 VITALS — BODY MASS INDEX: 21.59 KG/M2 | HEIGHT: 74 IN | WEIGHT: 168.21 LBS

## 2023-08-30 DIAGNOSIS — F17.290: ICD-10-CM

## 2023-08-30 DIAGNOSIS — F12.10: ICD-10-CM

## 2023-08-30 DIAGNOSIS — F60.89: ICD-10-CM

## 2023-08-30 DIAGNOSIS — Z59.02: ICD-10-CM

## 2023-08-30 DIAGNOSIS — D68.51: ICD-10-CM

## 2023-08-30 DIAGNOSIS — Z88.8: ICD-10-CM

## 2023-08-30 DIAGNOSIS — M50.30: ICD-10-CM

## 2023-08-30 DIAGNOSIS — Z88.0: ICD-10-CM

## 2023-08-30 DIAGNOSIS — F15.20: ICD-10-CM

## 2023-08-30 DIAGNOSIS — R45.851: ICD-10-CM

## 2023-08-30 DIAGNOSIS — F41.9: ICD-10-CM

## 2023-08-30 DIAGNOSIS — F33.3: Primary | ICD-10-CM

## 2023-08-30 DIAGNOSIS — K21.9: ICD-10-CM

## 2023-08-30 DIAGNOSIS — M51.36: ICD-10-CM

## 2023-08-30 DIAGNOSIS — Z81.8: ICD-10-CM

## 2023-08-30 LAB
ALBUMIN SERPL BCG-MCNC: 3.8 G/DL (ref 3.2–5.2)
ALP SERPL-CCNC: 44 U/L (ref 46–116)
ALT SERPL W P-5'-P-CCNC: 57 U/L (ref 7–40)
AMPHETAMINES UR QL SCN: NEGATIVE
APAP SERPL-MCNC: < 2 UG/ML (ref 10–20)
AST SERPL-CCNC: 21 U/L (ref ?–34)
BARBITURATES UR QL SCN: NEGATIVE
BENZODIAZ UR QL SCN: NEGATIVE
BILIRUB CONJ SERPL-MCNC: 0.1 MG/DL (ref ?–0.4)
BILIRUB SERPL-MCNC: 0.3 MG/DL (ref 0.3–1.2)
BUN SERPL-MCNC: 23 MG/DL (ref 9–23)
BZE UR QL SCN: NEGATIVE
CALCIUM SERPL-MCNC: 9.2 MG/DL (ref 8.5–10.1)
CANNABINOIDS UR QL SCN: POSITIVE
CHLORIDE SERPL-SCNC: 106 MMOL/L (ref 98–107)
CO2 SERPL-SCNC: 31 MMOL/L (ref 20–31)
CREAT SERPL-MCNC: 1.09 MG/DL (ref 0.7–1.3)
ETHANOL SERPL-MCNC: < 0.003 % (ref 0–0.01)
GFR SERPL CREATININE-BSD FRML MDRD: > 60 ML/MIN/{1.73_M2} (ref 60–?)
GLUCOSE SERPL-MCNC: 71 MG/DL (ref 60–100)
HCT VFR BLD AUTO: 41.8 % (ref 42–52)
HGB BLD-MCNC: 13.8 G/DL (ref 13.5–17.5)
MCH RBC QN AUTO: 29.9 PG (ref 27–33)
MCHC RBC AUTO-ENTMCNC: 33 G/DL (ref 32–36.5)
MCV RBC AUTO: 90.5 FL (ref 80–96)
METHADONE UR QL SCN: NEGATIVE
OPIATES UR QL SCN: NEGATIVE
PCP UR QL SCN: NEGATIVE
PLATELET # BLD AUTO: 407 10^3/UL (ref 150–450)
POTASSIUM SERPL-SCNC: 4.4 MMOL/L (ref 3.5–5.1)
PROT SERPL-MCNC: 6.5 G/DL (ref 5.7–8.2)
RBC # BLD AUTO: 4.62 10^6/UL (ref 4.3–6.1)
SALICYLATES SERPL-MCNC: < 3 MG/DL (ref ?–30)
SODIUM SERPL-SCNC: 142 MMOL/L (ref 136–145)
TSH SERPL DL<=0.005 MIU/L-ACNC: 1.42 UIU/ML (ref 0.55–4.78)
WBC # BLD AUTO: 8.4 10^3/UL (ref 4–10)

## 2023-08-30 SDOH — ECONOMIC STABILITY - HOUSING INSECURITY: UNSHELTERED HOMELESSNESS: Z59.02

## 2023-09-01 VITALS — SYSTOLIC BLOOD PRESSURE: 127 MMHG | DIASTOLIC BLOOD PRESSURE: 83 MMHG | TEMPERATURE: 97.1 F

## 2023-09-01 VITALS — OXYGEN SATURATION: 100 % | TEMPERATURE: 97.8 F | SYSTOLIC BLOOD PRESSURE: 119 MMHG | DIASTOLIC BLOOD PRESSURE: 69 MMHG

## 2023-09-01 RX ADMIN — MIRTAZAPINE SCH MG: 15 TABLET, FILM COATED ORAL at 21:06

## 2023-09-01 RX ADMIN — PAROXETINE HYDROCHLORIDE SCH MG: 25 TABLET, FILM COATED, EXTENDED RELEASE ORAL at 17:07

## 2023-09-02 VITALS — TEMPERATURE: 98.3 F | DIASTOLIC BLOOD PRESSURE: 76 MMHG | OXYGEN SATURATION: 100 % | SYSTOLIC BLOOD PRESSURE: 130 MMHG

## 2023-09-02 VITALS — DIASTOLIC BLOOD PRESSURE: 84 MMHG | SYSTOLIC BLOOD PRESSURE: 121 MMHG | OXYGEN SATURATION: 99 % | TEMPERATURE: 97.1 F

## 2023-09-02 RX ADMIN — PAROXETINE HYDROCHLORIDE SCH MG: 25 TABLET, FILM COATED, EXTENDED RELEASE ORAL at 07:57

## 2023-09-02 RX ADMIN — MIRTAZAPINE SCH MG: 15 TABLET, FILM COATED ORAL at 21:24

## 2023-09-03 VITALS — DIASTOLIC BLOOD PRESSURE: 84 MMHG | SYSTOLIC BLOOD PRESSURE: 134 MMHG | OXYGEN SATURATION: 95 % | TEMPERATURE: 97.9 F

## 2023-09-03 VITALS — SYSTOLIC BLOOD PRESSURE: 125 MMHG | TEMPERATURE: 97.3 F | DIASTOLIC BLOOD PRESSURE: 69 MMHG | OXYGEN SATURATION: 98 %

## 2023-09-03 RX ADMIN — MIRTAZAPINE SCH MG: 15 TABLET, FILM COATED ORAL at 20:22

## 2023-09-03 RX ADMIN — PAROXETINE HYDROCHLORIDE SCH MG: 25 TABLET, FILM COATED, EXTENDED RELEASE ORAL at 08:25

## 2023-09-04 VITALS — TEMPERATURE: 96.9 F | SYSTOLIC BLOOD PRESSURE: 137 MMHG | DIASTOLIC BLOOD PRESSURE: 93 MMHG

## 2023-09-04 VITALS — OXYGEN SATURATION: 99 % | TEMPERATURE: 97.8 F | SYSTOLIC BLOOD PRESSURE: 140 MMHG | DIASTOLIC BLOOD PRESSURE: 86 MMHG

## 2023-09-04 RX ADMIN — MIRTAZAPINE SCH MG: 15 TABLET, FILM COATED ORAL at 21:32

## 2023-09-04 RX ADMIN — PAROXETINE HYDROCHLORIDE SCH MG: 25 TABLET, FILM COATED, EXTENDED RELEASE ORAL at 09:06

## 2023-09-04 RX ADMIN — BENZTROPINE MESYLATE SCH MG: 1 TABLET ORAL at 14:50

## 2023-09-05 VITALS — DIASTOLIC BLOOD PRESSURE: 86 MMHG | TEMPERATURE: 98.3 F | OXYGEN SATURATION: 95 % | SYSTOLIC BLOOD PRESSURE: 145 MMHG

## 2023-09-05 VITALS — TEMPERATURE: 97.8 F | DIASTOLIC BLOOD PRESSURE: 89 MMHG | SYSTOLIC BLOOD PRESSURE: 134 MMHG | OXYGEN SATURATION: 100 %

## 2023-09-05 RX ADMIN — LIDOCAINE SCH PATCH: 50 PATCH CUTANEOUS at 11:38

## 2023-09-05 RX ADMIN — MIRTAZAPINE SCH MG: 15 TABLET, FILM COATED ORAL at 21:33

## 2023-09-05 RX ADMIN — PAROXETINE HYDROCHLORIDE SCH MG: 25 TABLET, FILM COATED, EXTENDED RELEASE ORAL at 08:19

## 2023-09-05 RX ADMIN — BENZTROPINE MESYLATE SCH MG: 1 TABLET ORAL at 08:19

## 2023-09-06 VITALS — SYSTOLIC BLOOD PRESSURE: 126 MMHG | TEMPERATURE: 98.6 F | DIASTOLIC BLOOD PRESSURE: 81 MMHG

## 2023-09-06 VITALS — TEMPERATURE: 99.1 F | DIASTOLIC BLOOD PRESSURE: 98 MMHG | SYSTOLIC BLOOD PRESSURE: 134 MMHG

## 2023-09-06 RX ADMIN — MIRTAZAPINE SCH MG: 15 TABLET, FILM COATED ORAL at 20:17

## 2023-09-06 RX ADMIN — PAROXETINE HYDROCHLORIDE SCH MG: 25 TABLET, FILM COATED, EXTENDED RELEASE ORAL at 08:05

## 2023-09-06 RX ADMIN — BENZTROPINE MESYLATE SCH MG: 1 TABLET ORAL at 08:05

## 2023-09-06 RX ADMIN — LIDOCAINE SCH PATCH: 50 PATCH CUTANEOUS at 08:06

## 2023-09-07 VITALS — SYSTOLIC BLOOD PRESSURE: 141 MMHG | TEMPERATURE: 96.6 F | OXYGEN SATURATION: 98 % | DIASTOLIC BLOOD PRESSURE: 95 MMHG

## 2023-09-07 RX ADMIN — LIDOCAINE SCH PATCH: 50 PATCH CUTANEOUS at 09:00

## 2023-09-07 RX ADMIN — BENZTROPINE MESYLATE SCH MG: 1 TABLET ORAL at 08:24

## 2023-09-07 RX ADMIN — PAROXETINE HYDROCHLORIDE SCH MG: 25 TABLET, FILM COATED, EXTENDED RELEASE ORAL at 08:24
